# Patient Record
Sex: MALE | Race: BLACK OR AFRICAN AMERICAN
[De-identification: names, ages, dates, MRNs, and addresses within clinical notes are randomized per-mention and may not be internally consistent; named-entity substitution may affect disease eponyms.]

---

## 2017-01-07 ENCOUNTER — HOSPITAL ENCOUNTER (OUTPATIENT)
Dept: HOSPITAL 53 - M LAB | Age: 72
End: 2017-01-07
Attending: NURSE PRACTITIONER
Payer: COMMERCIAL

## 2017-01-07 DIAGNOSIS — I10: Primary | ICD-10-CM

## 2017-01-07 DIAGNOSIS — R97.20: ICD-10-CM

## 2017-01-07 LAB
ANION GAP SERPL CALC-SCNC: 7 MEQ/L (ref 8–16)
BUN SERPL-MCNC: 33 MG/DL (ref 7–18)
CALCIUM SERPL-MCNC: 8.6 MG/DL (ref 8.8–10.2)
CHLORIDE SERPL-SCNC: 105 MEQ/L (ref 98–107)
CO2 SERPL-SCNC: 30 MEQ/L (ref 21–32)
CREAT SERPL-MCNC: 1.41 MG/DL (ref 0.7–1.3)
GFR SERPL CREATININE-BSD FRML MDRD: > 60 ML/MIN/{1.73_M2} (ref 49–?)
GLUCOSE SERPL-MCNC: 229 MG/DL (ref 80–110)
POTASSIUM SERPL-SCNC: 4 MEQ/L (ref 3.5–5.1)
SODIUM SERPL-SCNC: 142 MEQ/L (ref 136–145)

## 2017-01-09 ENCOUNTER — HOSPITAL ENCOUNTER (OUTPATIENT)
Dept: HOSPITAL 53 - M RAD | Age: 72
End: 2017-01-09
Attending: UROLOGY
Payer: COMMERCIAL

## 2017-01-09 DIAGNOSIS — R97.20: Primary | ICD-10-CM

## 2017-01-09 DIAGNOSIS — M87.051: ICD-10-CM

## 2017-01-10 NOTE — REP
Multiparametric prostate MRI without and with IV gadolinium:

 

History: Elevated PSA.

 

Comparisons: September 22, 2015.

 

TECHNIQUE: Using a phased array surface coil, small field of view imaging was

acquired using T2-weighted scans in the axial, coronal, and sagittal imaging

planes. Small field of view diffusion-weighted sequences are acquired. Small

field of view axial T1-weighted scans are acquired dynamically after the

intravenous administration of  18 mL of ProHance.

 

Prostate MRI findings: There is no evidence of skeletal metastatic disease or

pelvic or inguinal lymphadenopathy.  Again noted is evidence of avascular

necrosis affecting the right femoral head.  This appears unchanged.  Urinary

bladder walls are smooth.  Seminal vesicles remain unremarkable.  There is

nodular enlargement of the central gland in the prostate.

 

Prostate glandular dimensions are 5.7 x 4.4 x 5.7 cm.  Calculated glandular

volume is 66.12 ml.  This is quite similar to the prior study. There are four

focal lesions identified on multiparametric MRI scanning of the prostate as

outlined to follow:

 

Lesion #1 is in the left apical transition zone measuring 2.0 x 1.3 x 1.3 cm,

2.37 ml.  This is characterized by a discrete homogeneous low signal intensity on

T2-weighted scans with focal areas of reduced ADC and a type 3 enhancement curve.

Clinically significant cancer is equivocal.

 

Lesion #2 is in the left base transition zone measuring 2.1 x 1.8 x 1.0 cm, 1.69

ml volume.  This is also discrete homogeneous low signal intensity focus on

T2-weighted scans.  It has intermediate diffusion weighted characteristics and a

type 3 enhancement curve.  Clinically significant cancer is equivocal.

 

Lesion #3 is in the right mid anterior peripheral zone and right mid anterior

transition zone measuring 2.0 x 1.2 x 1.0 cm, calculated volume 1.26 ml.  It is a

homogeneous low T2 signal intensity focus confined to the prostate with no

reduction in ADC and a type 2 enhancement curve.  Clinically significant cancer

is equivocal.

 

Lesion 4 is located in the left mid posterior transition zone with a calculated

volume of 1.29 ml and dimensions of 1.8 x 1.4 x 1.0 cm.  It is low signal

intensity discrete focus on T2-weighted scans.  Intermediate diffusion-weighted

characteristics and type 3 enhancement curve.

 

These targets are similar but not identical to those outlined on the September 22, 2015 study.

 

Impression:

 

1.  Avascular necrosis again seen in the right femoral head.

 

2.  Enlarged multinodular prostate gland.  There are four targets identified on

multiparametric prostate MR. These are outlined and submitted for MR ultrasound

fusion, biopsy consideration.  No extraprostatic involvement or evidence of bony

metastatic disease.

 

 

 

 

Signed by

Dwayne Giraldo MD 01/10/2017 12:01 P

## 2017-01-13 ENCOUNTER — HOSPITAL ENCOUNTER (OUTPATIENT)
Dept: HOSPITAL 53 - M SMT PRO | Age: 72
Discharge: HOME | End: 2017-01-13
Attending: UROLOGY
Payer: COMMERCIAL

## 2017-01-13 DIAGNOSIS — R97.20: Primary | ICD-10-CM

## 2017-01-13 PROCEDURE — 76942 ECHO GUIDE FOR BIOPSY: CPT

## 2017-01-13 PROCEDURE — 76872 US TRANSRECTAL: CPT

## 2017-01-13 PROCEDURE — 88344 IMHCHEM/IMCYTCHM EA MLT ANTB: CPT

## 2017-01-13 PROCEDURE — 55700: CPT

## 2017-01-13 NOTE — REP
Prostate sonography:

 

History: Elevated PSA.  MR ultrasound fusion biopsy.

 

Sonographic findings:  Trans rectal prostate sonography demonstrates unremarkable

seminal vesicles.  Prostate gland is heterogeneously enlarged with calcifications

and cystic changes noted.  Glandular dimensions are measured at 5.6 x 4.7 x 5.6

cm with a calculated glandular volume of 77.1 ml.

 

Transrectal sonographic guidance provided to Dr. Clemente who performed trans

rectal ultrasound guided needle biopsy procedure .

 

 

Signed by

Dwayne Giraldo MD 01/13/2017 11:36 A

## 2017-01-18 ENCOUNTER — HOSPITAL ENCOUNTER (INPATIENT)
Dept: HOSPITAL 53 - M ED | Age: 72
LOS: 2 days | Discharge: HOME | DRG: 113 | End: 2017-01-20
Attending: HOSPITALIST | Admitting: INTERNAL MEDICINE
Payer: COMMERCIAL

## 2017-01-18 VITALS — HEIGHT: 73 IN | BODY MASS INDEX: 26.3 KG/M2 | WEIGHT: 198.42 LBS

## 2017-01-18 VITALS — DIASTOLIC BLOOD PRESSURE: 58 MMHG | SYSTOLIC BLOOD PRESSURE: 120 MMHG

## 2017-01-18 DIAGNOSIS — Z87.891: ICD-10-CM

## 2017-01-18 DIAGNOSIS — Z79.899: ICD-10-CM

## 2017-01-18 DIAGNOSIS — D57.3: ICD-10-CM

## 2017-01-18 DIAGNOSIS — E11.9: ICD-10-CM

## 2017-01-18 DIAGNOSIS — M90.562: ICD-10-CM

## 2017-01-18 DIAGNOSIS — M90.551: ICD-10-CM

## 2017-01-18 DIAGNOSIS — I95.1: ICD-10-CM

## 2017-01-18 DIAGNOSIS — N17.9: ICD-10-CM

## 2017-01-18 DIAGNOSIS — J10.1: Primary | ICD-10-CM

## 2017-01-18 DIAGNOSIS — D61.818: ICD-10-CM

## 2017-01-18 DIAGNOSIS — R97.20: ICD-10-CM

## 2017-01-18 LAB
ALBUMIN SERPL BCG-MCNC: 3.3 GM/DL (ref 3.2–5.2)
ALBUMIN/GLOB SERPL: 1 {RATIO} (ref 1–1.93)
ALP SERPL-CCNC: 66 U/L (ref 45–117)
ALT SERPL W P-5'-P-CCNC: 38 U/L (ref 12–78)
ANION GAP SERPL CALC-SCNC: 9 MEQ/L (ref 8–16)
AST SERPL-CCNC: 47 U/L (ref 15–37)
BASOPHILS # BLD AUTO: 0 K/MM3 (ref 0–0.2)
BASOPHILS NFR BLD AUTO: 0.6 % (ref 0–1)
BILIRUB CONJ SERPL-MCNC: 0.2 MG/DL (ref 0–0.2)
BILIRUB SERPL-MCNC: 0.8 MG/DL (ref 0.2–1)
BUN SERPL-MCNC: 38 MG/DL (ref 7–18)
CALCIUM SERPL-MCNC: 7.9 MG/DL (ref 8.8–10.2)
CHLORIDE SERPL-SCNC: 104 MEQ/L (ref 98–107)
CO2 SERPL-SCNC: 29 MEQ/L (ref 21–32)
CREAT SERPL-MCNC: 1.7 MG/DL (ref 0.7–1.3)
EOSINOPHIL # BLD AUTO: 0 K/MM3 (ref 0–0.5)
EOSINOPHIL NFR BLD AUTO: 1.1 % (ref 0–3)
ERYTHROCYTE [DISTWIDTH] IN BLOOD BY AUTOMATED COUNT: 12.3 % (ref 11.5–14.5)
EST. AVERAGE GLUCOSE BLD GHB EST-MCNC: 169 MG/DL (ref 60–110)
GFR SERPL CREATININE-BSD FRML MDRD: 51.8 ML/MIN/{1.73_M2} (ref 49–?)
GLUCOSE SERPL-MCNC: 147 MG/DL (ref 80–110)
LARGE UNSTAINED CELL #: 0.1 K/MM3 (ref 0–0.4)
LARGE UNSTAINED CELL %: 3.8 % (ref 0–4)
LYMPHOCYTES # BLD AUTO: 1.1 K/MM3 (ref 1.5–4.5)
LYMPHOCYTES NFR BLD AUTO: 33 % (ref 24–44)
MAGNESIUM SERPL-MCNC: 2.4 MG/DL (ref 1.8–2.4)
MCH RBC QN AUTO: 32.1 PG (ref 27–33)
MCHC RBC AUTO-ENTMCNC: 33.5 G/DL (ref 32–36.5)
MCV RBC AUTO: 95.7 FL (ref 80–96)
MONOCYTES # BLD AUTO: 0.5 K/MM3 (ref 0–0.8)
MONOCYTES NFR BLD AUTO: 16.3 % (ref 0–5)
NEUTROPHILS # BLD AUTO: 1.4 K/MM3 (ref 1.8–7.7)
NEUTROPHILS NFR BLD AUTO: 45.1 % (ref 36–66)
PLATELET # BLD AUTO: 126 K/MM3 (ref 150–450)
POTASSIUM SERPL-SCNC: 3.6 MEQ/L (ref 3.5–5.1)
PROT SERPL-MCNC: 6.6 GM/DL (ref 6.4–8.2)
REASON FOR REVIEW: (no result)
SODIUM SERPL-SCNC: 142 MEQ/L (ref 136–145)
WBC # BLD AUTO: 3 K/MM3 (ref 4–10)

## 2017-01-18 NOTE — REPUSA
CLINICAL HISTORY: Syncope.

TECHNIQUE: Multiple axial CT images were obtained through the brain without IV contrast material.

COMMENTS: 

There is normal configuration of sella turcica. There are no intra or extra-axial collections. There 
is no mass effect or midline shift. There is no evidence of hematoma formation. No hydrocephalus is p
resent. The ventricles are symmetrical.  Bilateral basal ganglia calcifications are present. 

There is diffuse age-appropriate cerebellar and cerebral atrophy with proportionally dilated ventricl
es and cortical sulci. 

There are bilateral periventricular and subcortical white matter hypolucencies compatible with mild c
hronic microvascular disease.

Otherwise, no significant focal abnormalities are seen either in the posterior fossa or supratentoria
l compartment.

IMPRESSION:

1. Age-appropriate cerebellar and cerebral atrophy.

2. Mild chronic microvascular disease.

3. No evidence of acute intracranial pathology. 

Thank you for your kind referral of this patient.

 

     Electronically signed by GORGE DURAN MD on 01/18/2017 07:55:20 PM ET

## 2017-01-18 NOTE — HPE
DATE OF ADMISSION:  01/18/2017

 

PRIMARY CARE PROVIDER: Marry Steele MD

 

UROLOGIST: Hermilo Clemente MD

 

CHIEF COMPLAINT: "I fell"

 

SUMMARY OF HIS PRESENTATION: Mr. Sam had a cold on Sunday, went to work, was

unable to go to work on Monday and Tuesday because he felt weak. He had

subjective fever, cough, which he treated with over-the-counter remedies. Awoke

on Wednesday morning which is this morning feeling quite well. His car had been

in the repair shop and he felt well enough to go get it. He has been eating and

drinking apparently normally. He tends to urinate more frequently than he thinks

he should. He decided to go to work today. Usually he goes to bed around 4:00 pm

and gets up at 8:30 pm for his 10:00 pm shift. He went to the refrigerator to get

a bottle of water. He felt a little light headed and then the next thing he knew

he awoke on the floor with an open bottle of water. He does not know how long he

was down. He did not remember falling. Afterwards his memory seems a little

sketchy but he got onto the couch. His wife returned around 6:15 and he was on

the couch which was unusual. Usually he would be in the bedroom at that point. He

did not know what day it was and seemed a little slower than normal. He has never

had an event like this happen before. He did not suffer incontinence. He did have

left leg pain after the event, which hurts around his knee when he bends the

knee. Around the time of the event he noted no unusual smells, sights or sounds.

He has no history of seizure. He described no chest pain, no palpitation. This

has never happened before.

 

PAST MEDICAL HISTORY: Notable for:

Anemia.

Diabetes.

Hypertension.

History of pyelonephritis.

History of elevated prostate level.

History of kidney stones.

 

PAST SURGICAL HISTORY: Notable for:

A pair of stomach ulcers.

Various urologic procedures.

 

SOCIAL HISTORY: He quit smoking in 1992. Does not drink any alcohol. He lives in

[place]. He is originally from [place]. He has been in [place] for 20

years.

 

FAMILY HISTORY: Mother is 89, alive and well. Father who passed away in his 60s

of unknown causes.

 

REVIEW OF SYSTEMS: No headache, no visual changes. He has had runny nose and a

sore throat. He has had some cough. No chest pain. No orthopnea. No paroxysmal

nocturnal dyspnea. No lower extremity edema. He has diabetes which is treated

without insulin. He has had some medication changes recently. He has not filled

those prescriptions yet though. He does not regularly see a podiatrist. No

abdominal pain. No changes in his bowel or bladder habits, otherwise

unremarkable.

 

PHYSICAL EXAMINATION: Blood pressure is notable for him having orthostasis based

on his heart rate. Blood pressure on arrival was 95/59 with a pulse of 87.

Respiratory rate 18, temperature 98.6, 95% on room air. He weighs 92.99 kg. Body

mass index is 27. He is awake, appropriately interactive and pleasantly

conversant although he appears somewhat tired. Head is normocephalic. Sclera are

injected. Pupils are equal, round and reactive. Mucous membranes moist. He has a

dental plate. Neck is supple. No cervical or supraclavicular adenopathy.

Breathing is symmetrical and rested. I to E ratio is 1 to 3. There are no

wheezes, rales or rhonchi. Heart is in regular rate and rhythm. Distant sounding.

Normal S1, S2. Is not tachycardic. Distal pulses radius are 2+. Capillary refill

is less than 2 seconds. Abdomen is soft, doughy, nontender. There is a midline

incision superior to the umbilicus running to approximately the xyphoid. There is

no lower extremity edema. There are no foot lesions. Sensation is grossly intact.

He is moving all four extremities. Cranial nerves II through XII are grossly

intact. He has normal mood and affect.

 

White cell count is 3. Hemoglobin is 12 and platelets of 126. Sodium 142. BUN 38,

creatinine 1.7, which is higher than his baseline, which is probably around 1.4.

Glucose is 147. Hemoglobin A1c is 7.5. Calcium 7.9. . Troponin I less than

0.02. TSH 0.77.

 

UA is notable for 3+ glucose, 1+ blood.

 

Urine culture is pending.

 

Tibia/fibula x-ray shows unusual lesions around the knee which may require

further workup.

 

Head CT was age appropriate with some mild chronic microvascular disease.

 

Chest x-ray showed no infiltrate, mass or effusion. Lungs were clear and

unchanged.

 

EKG shows the patient to be in a sinus rhythm with QTc of 399.

 

ASSESSMENT: This is a 71-year-old with syncopal event. The patient required two

midnight hospital stay for further workup and treatment.

 

PLAN:

1. Syncope. This has elements of both a cardiac and neurologic event. The patient

took awhile to awake. At this point, he is back to his normal mentation according

to his wife, but approximately two hours after the event he did not know the day,

which would be less consistent with a cardiac event. As such, we will pursue and

MRI as well as an EEG. Given his age and history, will also obtain an

echocardiogram and monitor him on telemetry. Repeat a CK and troponin with the

next blood draw in the morning. We will consult as necessary.

 

2. The patient is orthostatic, which could be the cause of his presentation as

well, although once again he had a prolonged recovery time. Will hold his

antihypertensive medications, give IV fluid and continue to monitor him

clinically.

 

3. The patient had an upper respiratory tract infection. Will swab for influenza

and again monitor him clinically.

 

4. The patient is noted to be pancytopenic. This could be the result of the a

viral illness. Will get a peripheral smear and pursue other workup as viral

suppression is most likely cause.

 

5. The patient has diabetes. Will be placed on insulin during his stay.

Hemoglobin A1c is 7.5.

 

6. The patient has elevated PSA.

 

7. The patient has unusual findings on his x-ray of leg. May require followup

based on the official read.

 

8. Deep venous thrombosis (DVT) prophylaxis will be mechanical as his platelets

are lower than normal.

## 2017-01-18 NOTE — EDDOCDS
Physician Documentation                                                                           

[facility]                                                                         

Name: Jose Antonio Sam                                                                               

Age: 71 yrs                                                                                       

Sex: Male                                                                                         

: 1945                                                                                   

MRN: [id number]                                                                                     

Arrival Date: 2017                                                                          

Time: 18:30                                                                                       

Account#: [id number]                                                                              

Bed 11                                                                                            

Private MD: Marry Steele A                                                                     

Disposition:                                                                                      

17 21:18 Hospitalization ordered by Mayur Mckeon for Inpatient Admission. Preliminary       

diagnosis are Syncope and collapse, Hypotension, Pancytopenia.                                  

- Bed requested for  PCU.                                                                        

- Status is Inpatient Admission.                                                              sls1

- Condition is Stable.                                                                            

- Problem is an acute exacerbation.                                                               

- Symptoms have improved.                                                                         

                                                                                                  

                                                                                                  

                                                                                                  

Historical:                                                                                       

- Allergies: no known allergies;                                                                  

- Home Meds:                                                                                      

1. aspirin 81 mg Oral TbEC once daily on hold                                                   

2. Drisdol 50,000 unit Oral cap every 2 weeks                                                   

3. Hyzaar 100-12.5 mg Oral tab 1 tab once daily                                                 

4. Tiazac 120 mg Oral cpER 1 cap once daily                                                     

5. vitamin b12 inj monthly                                                                      

6. Vitamin B-6 100 mg Oral tab                                                                  

- PMHx: Anemia; Diabetes - NIDDM: controlled; Hypertension; Pyelonephritis;                       

- PSHx: repair of stomach ulcers;                                                                 

- Social history: Smoking status: Patient states former smoker of tobacco. No barriers            

to communication noted, The patient speaks fluent English, Speaks appropriately for             

age.                                                                                            

- Family history: Not pertinent.                                                                  

- : The pt / caregiver states he / she is not on anticoagulants. Home medication list             

is obtained from the patient.                                                                   

- Exposure Risk Screening:: None identified.                                                      

                                                                                                  

                                                                                                  

Vital Signs:                                                                                      

                                                                                             

18:32 BP 95 / 59; Pulse 87; Resp 18 S; Temp 98.6(O); Pulse Ox 95% on R/A; Weight 92.99 kg /   gr2 

      205.01 lbs (R); Height 6 ft. 1 in. (185.42 cm) (R); Pain 7/10;                              

18:42  / 56 (auto/);                                                                    kas2

18:45 Pulse 80 MON; Pulse Ox 95% ;                                                            kas2

18:45 Resp 18; Temp 98.2(O); Pain 0/10;                                                       kas2

18:57  / 58 (auto/);                                                                    kas2

18:57 Pulse 84 MON; Pulse Ox 93% ;                                                            kas2

19:12  / 60 (auto/);                                                                    kas2

19:12 Pulse 88 MON; Pulse Ox 96% ;                                                            kas2

19:27  / 61 (auto/);                                                                    kas2

19:27 Pulse 82 MON; Pulse Ox 96% ;                                                            kas2

19:42 BP 99 / 50 (auto/);                                                                     kas2

19:42 Pulse 84 MON; Pulse Ox 94% ;                                                            kas2

19:57  / 59 (auto/);                                                                    kas2

19:57 Pulse 80 MON; Pulse Ox 93% ;                                                            kas2

20:12 BP 98 / 54 RA Supine; Pulse 77; Resp 18; Temp 96.2; Pulse Ox 92% on R/A; Pain 0/10;     kas2

20:12 BP 83 / 53 RA Sitting; Pulse 86; Resp 18; Pulse Ox 94% on R/A;                          kas2

20:12 BP 87 / 54 RA Standing; Pulse 95; Resp 18; Pulse Ox 97% on R/A;                         kas2

20:12  / 60 (auto/);                                                                    kas2

20:12 Pulse 76 MON; Pulse Ox 93% ;                                                            kas2

20:27  / 59 (auto/);                                                                    kas2

20:27 Pulse 74 MON; Pulse Ox 99% ;                                                            kas2

20:42  / 59 (auto/);                                                                    kas2

20:42 Pulse 74 MON; Pulse Ox 92% ;                                                            kas2

20:57  / 58 (auto/);                                                                    kas2

20:57 Pulse 74 MON; Pulse Ox 96% ;                                                            kas2

21:12  / 61 (auto/);                                                                    kas2

21:12 Pulse 74 MON; Pulse Ox 95% ;                                                            kas2

21:27  / 62 (auto/);                                                                    kas2

21:27 Pulse 76 MON; Pulse Ox 96% ;                                                            kas2

21:42  / 59 (auto/);                                                                    kas2

21:42 Pulse 78 MON; Pulse Ox 98% ;                                                            kas2

21:57  / 66 (auto/);                                                                    kas2

21:57 Pulse 78 MON; Pulse Ox 98% ;                                                            kas2

22:12  / 59 (auto/);                                                                    kas2

22:12 Pulse 78 MON; Pulse Ox 96% ;                                                            kas2

22:15  / 59; Pulse 78; Resp 18; Temp 97.9; Pulse Ox 98% on R/A; Pain 0/10;              kas2

18:32 Body Mass Index 27.05 (92.99 kg, 185.42 cm)                                             gr2 

                                                                                                  

MDM:                                                                                              

18:55 ECG WITH READING ER PHYS+CARDIAG ordered.                                               EDMS

19:06 Fingerstick Blood Sugar Ordered.                                                        EDMS

19:35 Cardiac Monitor/Pulse Ox/q 15 min VS ordered.                                           mm11

19:35 IV Saline Lock ordered.                                                                 mm11

19:35 Orthostatic VS ordered.                                                                 mm11

19:35 Rhythm Strip to chart ordered.                                                          mm11

19:35 Basic Metabolic Profile Ordered.                                                        EDMS

19:35 CBC with Diff Ordered.                                                                  EDMS

19:35 Cardiac Injury Profile Ordered.                                                         EDMS

19:36 Thyroid Stimulating Hormone Ordered.                                                    EDMS

19:36 Troponin Ordered.                                                                       EDMS

19:36 Urinalysis Ordered.                                                                     EDMS

19:36 Urine Culture Ordered.                                                                  EDMS

19:36 Chest, 1 View Ordered.                                                                  EDMS

19:36 Tibia/Fibula Ordered.                                                                   EDMS

19:36 CT Head Without Contrast Ordered.                                                       EDMS

19:39 Financial registration complete.                                                        zo  

19:40 [facility] Payment Agreement was scanned into [employer] and attached to record.               zo  

20:31 Fingerstick Blood Sugar Reviewed.                                                       mm11

20:31 Basic Metabolic Profile Reviewed.                                                       mm11

20:31 CBC with Diff Reviewed.                                                                 mm11

20:31 Cardiac Injury Profile Reviewed.                                                        mm11

20:31 Thyroid Stimulating Hormone Reviewed.                                                   mm11

20:31 Troponin Reviewed.                                                                      mm11

20:35 NS 0.9% 1000 ml IV at bolus once ordered.                                               mm11

21:05 Urinalysis Reviewed.                                                                    mm11

21:05 CT Head Without Contrast Reviewed.                                                      mm11

21:07 BED REQUEST+ADM ordered.                                                                EDMS

21:14 Hemoglobin A1c Ordered.                                                                 EDMS

21:51 Hemoglobin A1c Reviewed.                                                                mm11

21:57 Admission / Observation Status ordered.                                                 EDMS

21:57 ECHOCARD,DOPPLER/COLOR FLOW ordered.                                                    EDMS

21:57 ELECTROCARDIOGRAM ADULT ordered.                                                        EDMS

21:57 CONSISTENT CARBOHYDRATES ordered.                                                       EDMS

21:57 CBC WITH DIFFERENTIAL Ordered.                                                          EDMS

21:58 COMPLETE COMPHRENSIVE METABOLI Ordered.                                                 EDMS

21:58 MAGNESIUM LEVEL Ordered.                                                                EDMS

22:01 INFLUENZA A&B RAPID ANTIGEN Ordered.                                                    EDMS

22:03 CARDIAC MARKER PANEL Ordered.                                                           EDMS

22:03 IONIZED CALCIUM Ordered.                                                                EDMS

22:04 LIVER PROFILE Ordered.                                                                  EDMS

22:05 MAGNESIUM LEVEL Ordered.                                                                EDMS

22:05 PATHOLOGIST REVIEW COMPREHENSI Ordered.                                                 EDMS

22:19 MRA BRAIN W/O CONTRAST Ordered.                                                         EDMS

22:19 MRI Brain without Contrast Ordered.                                                     EDMS

                                                                                                  

Administered Medications:                                                                         

20:45 Drug: NS 0.9% 1000 ml [sodium chloride 0.9 % intravenous solution] Route: IV; Rate:     kas2

      bolus; Site: right forearm;                                                                 

                                                                                                  

                                                                                                  

Signatures:                                                                                       

Dispatcher [employer]                           EDRosi Cheung RN            RN   Oli Prather RN                   RN   mlb1                                                 

Shailesh Ramirez Matthew,                     DO   mm11                                                 

Mariella Kumar RN                     RN   sls1                                                 

Nasreen Crandall RN                            RN   kas2                                                 

                                                                                                  

The chart was reviewed and I authenticate all verbal orders and agree with the evaluation and 
treatment provided.Corrections: (The following items were deleted from the chart)

22:02 22:01 LIVER PROFILE ordered. EDMS                                                       EDMS

22:04 22:02 MAGNESIUM LEVEL ordered. EDMS                                                     EDMS

                                                                                                  

Attachments:                                                                                      

19:40 [facility] Payment Agreement                                                                zo  

                                                                                                  

**************************************************************************************************

MTDD

## 2017-01-18 NOTE — EDDOCDS
Nurse's Notes                                                                                     

[facility]                                                                         

Name: Jose Antonio Sam                                                                               

Age: 71 yrs                                                                                       

Sex: Male                                                                                         

: 1945                                                                                   

MRN: [id number]                                                                                     

Arrival Date: 2017                                                                          

Time: 18:30                                                                                       

Account#: [id number]                                                                              

Bed 11                                                                                            

Private MD: Marry Steele A                                                                     

Diagnosis: Syncope and collapse;Hypotension;Pancytopenia                                          

                                                                                                  

Presentation:                                                                                     

                                                                                             

18:36 Presenting complaint: Patient states: Syncopal episode at home at 1430 today c/o pain   mlb1

      left knee. Adult Sepsis Screening: The patient does not have new or worsening altered       

      mentation. Patient's respiratory rate is less than 22. Systolic blood pressure is           

      greater than 100. Patient has a qSOFA score of 0- Negative Sepsis Screen.                   

      Suicide/Homicide risk assessment- the patient denies having any suicidal and/or             

      homicidal ideations and does not present with any other emotional, behavioral or mental     

      health complaints.  Status: Patient is not a  or              

      dependent. Transition of care: patient was not received from another setting of care.       

18:36 Acuity: MIRIAN Level 3                                                                     mlb1

18:36 Method Of Arrival: Walkin/Carried/Asstd                                                 mlb1

                                                                                                  

Triage Assessment:                                                                                

18:39 General: Appears in no apparent distress, Behavior is appropriate for age, cooperative. mlb1

      Pain: Location: lateral aspect of left knee Pain currently is 8 out of 10 on a pain         

      scale. Neurological: Level of Consciousness is awake, alert, Oriented to person, place,     

      time.                                                                                       

                                                                                                  

Historical:                                                                                       

- Allergies: no known allergies;                                                                  

- Home Meds:                                                                                      

1. aspirin 81 mg Oral TbEC once daily on hold                                                   

2. Drisdol 50,000 unit Oral cap every 2 weeks                                                   

3. Hyzaar 100-12.5 mg Oral tab 1 tab once daily                                                 

4. Tiazac 120 mg Oral cpER 1 cap once daily                                                     

5. vitamin b12 inj monthly                                                                      

6. Vitamin B-6 100 mg Oral tab                                                                  

- PMHx: Anemia; Diabetes - NIDDM: controlled; Hypertension; Pyelonephritis;                       

- PSHx: repair of stomach ulcers;                                                                 

- Social history: Smoking status: Patient states former smoker of tobacco. No barriers            

to communication noted, The patient speaks fluent English, Speaks appropriately for             

age.                                                                                            

- Family history: Not pertinent.                                                                  

- : The pt / caregiver states he / she is not on anticoagulants. Home medication list             

is obtained from the patient.                                                                   

- Exposure Risk Screening:: None identified.                                                      

                                                                                                  

                                                                                                  

Screenin:41 Screening information is obtained from the patient. Fall risk: No risks identified.     kas2

      Assistance ADL's: requires no assistance with activities of daily living. Abuse/DV          

      Screen: The patient / caregiver reports he/she is: not in a situation that causes fear,     

      pain or injury. Nutritional screening: No deficits noted. Advance Directives:               

      Currently, there is no health care proxy. There is no active DNR order. There is no         

      living will. There is no Power of . home support is adequate.                       

                                                                                                  

Assessment:                                                                                       

19:35 General: Patient gone to CT scan via stretcher with tech..                              kas2

19:40 General: Appears in no apparent distress, comfortable, well nourished, well groomed,    kas2

      Behavior is appropriate for age, cooperative. Pain: Denies pain. Neurological: Level of     

      Consciousness is awake, alert, Oriented to person, place, time,  are equal             

      bilaterally Moves all extremities. Speech is normal, Facial symmetry appears normal,        

      Pupils are PERRLA. Cardiovascular: Capillary refill < 3 seconds Heart tones S1 S2           

      present Rhythm is sinus rhythm No ectopy. Respiratory: Airway is patent Respiratory         

      effort is even, unlabored, Respiratory pattern is regular, symmetrical, Breath sounds       

      are clear bilaterally. Derm: Skin is intact, is healthy with good turgor, Skin is dry,      

      Skin temperature is warm.                                                                   

20:14 General:.                                                                               kas2

20:15 General: Patient back from CT scan via stretcher with tech. Resettled in bed. Patient   kas2

      denies pain or discomfort at this time. No apparent distress. Family at bedside. Call       

      bell within reach. Will continue to monitor..                                               

21:25 General: Patient laying in bed with family at bedside. No apparent distress noted.      kas2

      Patient denies pain or discomfort at this time. Airway patent and respiratory pattern       

      even and unlabored. Call bell within reach. Will continue to monitor..                      

22:13 General: Appears in no apparent distress, comfortable, Behavior is appropriate for age, kas2

      cooperative. Pain: Denies pain. Neurological: Level of Consciousness is awake, alert,       

      Oriented to person, place, time. Cardiovascular: Capillary refill < 3 seconds Heart         

      tones S1 S2 present Rhythm is sinus rhythm No ectopy. Respiratory: Airway is patent         

      Respiratory effort is even, unlabored, Respiratory pattern is regular, symmetrical,         

      Breath sounds are clear bilaterally. Derm: Skin is intact, is healthy with good turgor,     

      Skin is dry, Skin temperature is warm.                                                      

                                                                                                  

Vital Signs:                                                                                      

18:32 BP 95 / 59; Pulse 87; Resp 18 S; Temp 98.6(O); Pulse Ox 95% on R/A; Weight 92.99 kg     gr2 

      (R); Height 6 ft. 1 in. (185.42 cm) (R); Pain 7/10;                                         

18:42  / 56 (auto/);                                                                    kas2

18:45 Pulse 80 MON; Pulse Ox 95% ;                                                            kas2

18:45 Resp 18; Temp 98.2(O); Pain 0/10;                                                       kas2

18:57  / 58 (auto/);                                                                    kas2

18:57 Pulse 84 MON; Pulse Ox 93% ;                                                            kas2

19:12  / 60 (auto/);                                                                    kas2

19:12 Pulse 88 MON; Pulse Ox 96% ;                                                            kas2

19:27  / 61 (auto/);                                                                    kas2

19:27 Pulse 82 MON; Pulse Ox 96% ;                                                            kas2

19:42 BP 99 / 50 (auto/);                                                                     kas2

19:42 Pulse 84 MON; Pulse Ox 94% ;                                                            kas2

19:57  / 59 (auto/);                                                                    kas2

19:57 Pulse 80 MON; Pulse Ox 93% ;                                                            kas2

20:12 BP 98 / 54 RA Supine; Pulse 77; Resp 18; Temp 96.2; Pulse Ox 92% on R/A; Pain 0/10;     kas2

20:12 BP 83 / 53 RA Sitting; Pulse 86; Resp 18; Pulse Ox 94% on R/A;                          kas2

20:12 BP 87 / 54 RA Standing; Pulse 95; Resp 18; Pulse Ox 97% on R/A;                         kas2

20:12  / 60 (auto/);                                                                    kas2

20:12 Pulse 76 MON; Pulse Ox 93% ;                                                            kas2

20:27  / 59 (auto/);                                                                    kas2

20:27 Pulse 74 MON; Pulse Ox 99% ;                                                            kas2

20:42  / 59 (auto/);                                                                    kas2

20:42 Pulse 74 MON; Pulse Ox 92% ;                                                            kas2

20:57  / 58 (auto/);                                                                    kas2

20:57 Pulse 74 MON; Pulse Ox 96% ;                                                            kas2

21:12  / 61 (auto/);                                                                    kas2

21:12 Pulse 74 MON; Pulse Ox 95% ;                                                            kas2

21:27  / 62 (auto/);                                                                    kas2

21:27 Pulse 76 MON; Pulse Ox 96% ;                                                            kas2

21:42  / 59 (auto/);                                                                    kas2

21:42 Pulse 78 MON; Pulse Ox 98% ;                                                            kas2

21:57  / 66 (auto/);                                                                    kas2

21:57 Pulse 78 MON; Pulse Ox 98% ;                                                            kas2

22:12  / 59 (auto/);                                                                    kas2

22:12 Pulse 78 MON; Pulse Ox 96% ;                                                            kas2

22:15  / 59; Pulse 78; Resp 18; Temp 97.9; Pulse Ox 98% on R/A; Pain 0/10;              kas2

18:32 Body Mass Index 27.05 (92.99 kg, 185.42 cm)                                             gr2 

                                                                                                  

Vitals:                                                                                           

18:32 Log In Time: 2017 at 18:32. RN notified that patient meets Red Flag         gr2 

      criteria.                                                                                   

                                                                                                  

ED Course:                                                                                        

18:32 Patient visited by [address]                                                   gr2 

18:32 Marry Steele is Private Physician.                                                    gr2 

18:32 Patient moved to Waiting                                                                gr2 

18:34 Patient visited by [address]                                                   gr2 

18:34 Patient moved to Pre RCE                                                                gr2 

18:35 Patient visited by Oli Kelley, SHERI.                                               mlb1

18:37 Triage Initiated                                                                        mlb1

18:39 Patient visited by Oli Kelley, RN.                                               mlb1

18:40 Elena Ortega,RN is Primary Nurse.                                                        mlb1

18:40 Patient moved to 11                                                                     mlb1

18:59 Nasreen Crandall,SHERI is Primary Nurse.                                                          kas2

19:05 Patient visited by Boubacar Chavira PCA.                                                     jmv 

19:05 EKG done. (by ED staff). Reviewed by Bret METCALF.                                   jmv 

19:06 Patient visited by Boubacar Chavira PCA.                                                     jmv 

19:18 Guy Watson DO is Attending Physician.                                            mm11

19:18 Patient visited by Guy Watson DO.                                                mm11

19:31 Patient visited by Guy Watson DO.                                                mm11

19:38 Basic Metabolic Profile Sent.                                                           kas2

19:38 CBC with Diff Sent.                                                                     kas2

19:38 Cardiac Injury Profile Sent.                                                            kas2

19:38 Thyroid Stimulating Hormone Sent.                                                       kas2

19:39 Troponin Sent.                                                                          kas2

19:40 [facility] Payment Agreement was scanned into [employer] and attached to record.               zo  

19:41 Patient visited by Nasreen Crandall RN.                                                        kas2

19:41 Inserted saline lock: 20 gauge in right forearm and blood collected. The patient        kas2

      tolerated the procedure well. No procedures done that require assistance.                   

19:44 Patient name changed from Jose Antonio\S\\S\Chaple\S\ to Jose Antonio\S\ \S\Chaple.                        
   EDMS

20:16 Patient visited by Nasreen Crandall RN.                                                        kas2

20:37 Urinalysis Sent.                                                                        kas2

20:37 Urine Culture Sent.                                                                     kas2

20:42 CT Head Without Contrast Returned.                                                      EDMS

21:12 Patient visited by Nasreen Crandall RN.                                                        kas2

21:15 Patient visited by Nasreen Crandall RN.                                                        kas2

21:18 Mayur Mckeon MD is Hospitalizing Provider.                                             mm11

21:44 Patient visited by Nasreen Crandall RN.                                                        kas2

22:19 Patient visited by Nasreen Crandall RN.                                                        kas2

22:29 The patient / caregiver is instructed regarding the plan of care and ED course.         kas2

22:31 Patient visited by Nasreen Crandall RN.                                                        kas2

                                                                                                  

Administered Medications:                                                                         

20:45 Drug: NS 0.9% 1000 ml [sodium chloride 0.9 % intravenous solution] Route: IV; Rate:     kas2

      bolus; Site: right forearm;                                                                 

                                                                                                  

                                                                                                  

Order Results:                                                                                    

Lab Order: Fingerstick Blood Sugar; SPEC'M 17 18:52                                         

      Test: BEDSIDE GLUCOSE; Value: 145; Range: ; Abnormal: Above high normal; Units:       

      MG/DL; Status: F                                                                            

Lab Order: Basic Metabolic Profile; SPEC'M 17 19:20                                         

      Test: GLUCOSE, FASTING; Value: 147; Range: ; Abnormal: Above high normal; Units:      

      MG/DL; Status: F                                                                            

      Test: BLOOD UREA NITROGEN; Value: 38; Range: 7-18; Abnormal: Above high normal; Units:      

      MG/DL; Status: F                                                                            

      Test: CREATININE FOR GFR; Value: 1.70; Range: 0.70-1.30; Abnormal: Above high normal;       

      Units: MG/DL; Status: F                                                                     

      Test: GLOMERULAR FILTRATION RATE; Value: 51.8; Range: >49; Status: F                        

      Test: SODIUM LEVEL; Value: 142; Range: 136-145; Units: MEQ/L; Status: F                     

      Test: POTASSIUM SERUM; Value: 3.6; Range: 3.5-5.1; Units: MEQ/L; Status: F                  

      Test: CHLORIDE LEVEL; Value: 104; Range: ; Units: MEQ/L; Status: F                    

      Test: CARBON DIOXIDE LEVEL; Value: 29; Range: 21-32; Units: MEQ/L; Status: F                

      Test: ANION GAP; Value: 9; Range: 8-16; Units: MEQ/L; Status: F                             

      Test: CALCIUM LEVEL; Value: 7.9; Range: 8.8-10.2; Abnormal: Below low normal; Units:        

      MG/DL; Status: F                                                                            

      Test Note: &nbsp;; Units are mL/min/1.73 m2 Chronic Kidney Disease Staging per NKF:       

      Stage I & II GFR >=60 Normal to Mildly Decreased Stage III GFR 30-59 Moderately           

      Decreased Stage IV GFR 15-29 Severely Decreased Stage V GFR <15 Very Little GFR Left        

      ESRD GFR <15 on RRT                                                                         

      Test: AST/SGOT; Range: 15-37; Units: U/L; Status: I                                         

      Test: ALT/SGPT; Range: 12-78; Units: U/L; Status: I                                         

      Test: ALKALINE PHOSPHATASE; Range: ; Units: U/L; Status: I                            

      Test: BILIRUBIN,TOTAL; Range: 0.2-1.0; Units: MG/DL; Status: I                              

      Test: BILIRUBIN,DIRECT; Range: 0.0-0.2; Units: MG/DL; Status: I                             

      Test: TOTAL PROTEIN; Range: 6.4-8.2; Units: GM/DL; Status: I                                

      Test: ALBUMIN; Range: 3.2-5.2; Units: GM/DL; Status: I                                      

      Test: ALBUMIN/GLOBULIN RATIO; Range: 1.00-1.93; Status: I                                   

Lab Order: CBC with Diff; SPEC'M 17 19:20                                                   

      Test: WHITE BLOOD COUNT; Value: 3.0; Range: 4.0-10.0; Abnormal: Below low normal;           

      Units: K/mm3; Status: F                                                                     

      Test: RED BLOOD COUNT; Value: 3.74; Range: 4.30-6.10; Abnormal: Below low normal;           

      Units: M/mm3; Status: F                                                                     

      Test: HEMOGLOBIN; Value: 12.0; Range: 14.0-18.0; Abnormal: Below low normal; Units:         

      g/dl; Status: F                                                                             

      Test: HEMATOCRIT; Value: 35.8; Range: 42.0-52.0; Abnormal: Below low normal; Units: %;      

      Status: F                                                                                   

      Test: MEAN CORPUSCULAR VOLUME; Value: 95.7; Range: 80.0-96.0; Units: fl; Status: F          

      Test: MEAN CORPUSCULAR HEMOGLOBIN; Value: 32.1; Range: 27.0-33.0; Units: pg; Status: F      

      Test: MEAN CORPUSCULAR HGB CONC; Value: 33.5; Range: 32.0-36.5; Units: g/dl; Status: F      

      Test: RED CELL DISTRIBUTION WIDTH; Value: 12.3; Range: 11.5-14.5; Units: %; Status: F       

      Test: PLATELET COUNT, AUTOMATED; Value: 126; Range: 150-450; Abnormal: Below low            

      normal; Units: k/mm3; Status: F                                                             

      Test: NEUTROPHILS %; Value: 45.1; Range: 36.0-66.0; Units: %; Status: F                     

      Test: LYMPH %; Value: 33.0; Range: 24.0-44.0; Units: %; Status: F                           

      Test: MONO %; Value: 16.3; Range: 0.0-5.0; Abnormal: Above high normal; Units: %;           

      Status: F                                                                                   

      Test: EOS %; Value: 1.1; Range: 0.0-3.0; Units: %; Status: F                                

      Test: BASO %; Value: 0.6; Range: 0.0-1.0; Units: %; Status: F                               

      Test: LARGE UNSTAINED CELL %; Value: 3.8; Range: 0.0-4.0; Units: %; Status: F               

      Test: NEUTROPHILS #; Value: 1.4; Range: 1.8-7.7; Abnormal: Below low normal; Units:         

      K/mm3; Status: F                                                                            

      Test: LYMPH #; Value: 1.1; Range: 1.5-4.5; Abnormal: Below low normal; Units: K/mm3;        

      Status: F                                                                                   

      Test: MONO #; Value: 0.5; Range: 0.0-0.8; Units: K/mm3; Status: F                           

      Test: EOS #; Value: 0.0; Range: 0.0-0.50; Units: K/mm3; Status: F                           

      Test: BASO #; Value: 0.0; Range: 0.0-0.2; Units: K/mm3; Status: F                           

      Test: LARGE UNSTAINED CELL #; Value: 0.1; Range: 0.0-0.4; Units: K/mm3; Status: F           

Lab Order: Cardiac Injury Profile; SPEC'M 17 19:20                                          

      Test: CPK CREATINE PHOSPHOKINASE; Value: 462; Range: ; Abnormal: Above high           

      normal; Units: U/L; Status: F                                                               

      Test: CK-MB VALUE MASS; Value: 2.5; Range: 0.0-3.6; Units: NG/ML; Status: F                 

      Test: MB/CK RELATIVE INDEX; Value: 0.54; Range: < OR =4; Status: F                          

      Test Note: &nbsp;; DIAGNOSIS CRITERIA MMB ng/ml Relative Index (RI) NON-AMI < or = 5      

      N/A GRAY ZONE > 5 < or = 4 AMI > 5 > 4                                                      

Lab Order: Thyroid Stimulating Hormone; SPEC'M 17 19:20                                     

      Test: THYROID STIMULATING HORMONE; Value: 0.770; Range: 0.358-3.740; Units: uIU/ML;         

      Status: F                                                                                   

Lab Order: Troponin; SPEC'M 17 19:20                                                        

      Test: TROPONIN I; Value: < 0.02; Range: < 0.10; Units: NG/ML; Status: F                     

      Test Note: &nbsp;; Troponin I Reference Interval for [employer] [place] LOCI: 99th             

      Percentile= 0.00-0.045 ng/ml Risk Stratification: <= 0.10 ng/ml Decreased Risk for          

      Adverse Clinical Events. 0.10-1.50 ng/ml Increased Risk for Adverse Clinical Events.        

      Evaluation of additional criterion and/or repeat testing in 2-6 hours is suggested to       

      rule out myocardial damage. >= 1.50 ng/ml Indicative of Myocardial Injury.                  

Lab Order: Urinalysis; SPEC'M 17 20:36                                                      

      Test: APPEARANCE, URINE; Value: CLEAR; Range: CLEAR; Status: F                              

      Test: COLOR, URINE; Value: YELLOW; Range: YELLOW; Status: F                                 

      Test: PH,URINE; Value: 5.0; Range: 5.0-9.0; Units: UNITS; Status: F                         

      Test: SPECIFIC GRAVITY URINE AUTO; Value: 1.012; Range: 1.002-1.035; Status: F              

      Test: PROTEIN, URINE AUTO; Value: NEGATIVE; Range: NEGATIVE; Units: mg/dL; Status: F        

      Test: GLUCOSE, URINE (UA) AUTO; Value: 3+; Range: NEGATIVE; Abnormal: Above high            

      normal; Units: mg/dL; Status: F                                                             

      Test: KETONE, URINE AUTO; Value: NEGATIVE; Range: NEGATIVE; Units: mg/dL; Status: F         

      Test: UROBILINOGEN, URINE AUTO; Value: 0.2; Range: 0.0-2.0; Units: mg/dL; Status: F         

      Test: BILIRUBIN, URINE AUTO; Value: NEGATIVE; Range: NEGATIVE; Status: F                    

      Test: NITRITE, URINE AUTO; Value: NEGATIVE; Range: NEGATIVE; Status: F                      

      Test: LEUKOCYTE ESTERASE, URINE AUTO; Value: NEGATIVE; Range: NEGATIVE; Status: F           

      Test: BLOOD, URINE BLOOD; Value: 1+; Range: NEGATIVE; Abnormal: Above high normal;          

      Status: F                                                                                   

      Test: WBC, URINE AUTO; Value: 1; Range: 0-3; Units: /HPF; Status: F                         

      Test: RBC, URINE AUTO; Value: 3; Range: 0-3; Units: /HPF; Status: F                         

      Test: BACTERIA, URINE AUTO; Value: NEGATIVE; Range: NEGATIVE; Status: F                     

      Test: SQUAMOUS EPITHELIAL CELL UR AU; Value: 0; Range: 0-6; Units: /HPF; Status: F          

      Test: MUCUS, URINE; Value: SMALL; Range: NEGATIVE; Status: F                                

      Test: HYALINE CAST, URINE AUTO; Value: 0; Range: 0-1; Units: /LPF; Status: F                

Lab Order: Hemoglobin A1c; SPEC'M 17 19:20                                                  

      Test: HEMOGLOBIN A1c; Value: 7.5; Range: 4.5-6.2; Abnormal: Above high normal; Units:       

      %; Status: F                                                                                

      Test: ESTIMATED AVERAGE GLUCOSE; Value: 169; Range: ; Abnormal: Above high            

      normal; Units: MG/DL; Status: F                                                             

Lab Order: LIVER PROFILE; SPEC'M 17 19:20                                                   

      Test: AST/SGOT; Value: 47; Range: 15-37; Abnormal: Above high normal; Units: U/L;           

      Status: F                                                                                   

      Test: ALT/SGPT; Value: 38; Range: 12-78; Units: U/L; Status: F                              

      Test: ALKALINE PHOSPHATASE; Value: 66; Range: ; Units: U/L; Status: F                 

      Test: BILIRUBIN,TOTAL; Value: 0.8; Range: 0.2-1.0; Units: MG/DL; Status: F                  

      Test: BILIRUBIN,DIRECT; Value: 0.2; Range: 0.0-0.2; Units: MG/DL; Status: F                 

      Test: TOTAL PROTEIN; Value: 6.6; Range: 6.4-8.2; Units: GM/DL; Status: F                    

      Test: ALBUMIN; Value: 3.3; Range: 3.2-5.2; Units: GM/DL; Status: F                          

      Test: ALBUMIN/GLOBULIN RATIO; Value: 1.00; Range: 1.00-1.93; Status: F                      

Lab Order: MAGNESIUM LEVEL; SPEC'M 17 19:20                                                 

      Test: MAGNESIUM LEVEL; Value: 2.4; Range: 1.8-2.4; Units: MG/DL; Status: F                  

Lab Order: PATHOLOGIST REVIEW COMPREHENSI; SPEC'M 17 19:20                                  

      Test: SLIDE REVIEW; Value: Report; Status: F                                                

      Test: SOURCE; Value: PERIPHERAL SMEAR; Status: F                                            

      Test: REASON FOR REVIEW; Value: PANCYTOPENIA; Status: F                                     

      Test Note: &nbsp;; Slide and/or specimen referred to Pathologist for review. Results of   

      the review are located in the EMR Pathology module under Peripheral Smear when              

      completed.                                                                                  

                                                                                                  

Radiology Order: CT Head Without Contrast                                                         

      Test: CT Head Without Contrast                                                              

      REASON FOR EXAMINATION: Syncope; ; CLINICAL HISTORY: Syncope.; TECHNIQUE: Multiple          

      axial CT images were obtained through the brain without IV contrast material.;              

      COMMENTS:; There is normal configuration of sella turcica. There are no intra or            

      extra-axial collections. There; is no mass effect or midline shift. There is no             

      evidence of hematoma formation. No hydrocephalus is p; resent. The ventricles are           

      symmetrical. Bilateral basal ganglia calcifications are present.; There is diffuse          

      age-appropriate cerebellar and cerebral atrophy with proportionally dilated ventricl;       

      es and cortical sulci.; There are bilateral periventricular and subcortical white           

      matter hypolucencies compatible with mild c; hronic microvascular disease.; Otherwise,      

      no significant focal abnormalities are seen either in the posterior fossa or                

      supratentoria; l compartment.; IMPRESSION:; 1. Age-appropriate cerebellar and cerebral      

      atrophy.; 2. Mild chronic microvascular disease.; 3. No evidence of acute intracranial      

      pathology.; Thank you for your kind referral of this patient.; ; Electronically signed      

      by GORGE DURAN MD on 2017 07:55:20 PM ET;                                             

Outcome:                                                                                          

21:18 Decision to Hospitalize by Provider.                                                    [id number]

22:29 Discharge Assessment: patient administered narcotics - no. The following High Risk      [facility]

      Discharge criteria are identified: None. Admitted to PCU accompanied by nurse,              

      accompanied by tech, via stretcher, on monitor, with chart. Condition: good Condition:      

      stable Condition: improved. CT Study completed. Property :Personal belongings accompany     

      Pt.                                                                                         

22:42 Patient left the ED.                                                                    sls1

                                                                                                  

Signatures:                                                                                       

Dispatcher [employer]                           EDMS                                                 

Oli Kelley RN                   RN   mlb1                                                 

Shailesh Ramirez Matthew, DO                    DO   mm11                                                 

Mariella Kumar RN                     RN   sls1                                                 

[address]                            gr2                                                  

Nasreen CrandallRN                            RN   kas2                                                 

Boubacar Chavira, PCA                         PCA  jmv                                                  

                                                                                                  

Corrections: (The following items were deleted from the chart)                                    

19:06 19:05 EKG done. (by ED staff). maria del rosario mix 

                                                                                                  

**************************************************************************************************

MTDD

## 2017-01-19 VITALS — DIASTOLIC BLOOD PRESSURE: 61 MMHG | SYSTOLIC BLOOD PRESSURE: 133 MMHG

## 2017-01-19 VITALS — DIASTOLIC BLOOD PRESSURE: 55 MMHG | SYSTOLIC BLOOD PRESSURE: 106 MMHG

## 2017-01-19 VITALS — DIASTOLIC BLOOD PRESSURE: 62 MMHG | SYSTOLIC BLOOD PRESSURE: 123 MMHG

## 2017-01-19 VITALS — SYSTOLIC BLOOD PRESSURE: 116 MMHG | DIASTOLIC BLOOD PRESSURE: 62 MMHG

## 2017-01-19 VITALS — SYSTOLIC BLOOD PRESSURE: 127 MMHG | DIASTOLIC BLOOD PRESSURE: 64 MMHG

## 2017-01-19 LAB
ALBUMIN SERPL BCG-MCNC: 3 GM/DL (ref 3.2–5.2)
ALBUMIN/GLOB SERPL: 1.03 {RATIO} (ref 1–1.93)
ALP SERPL-CCNC: 61 U/L (ref 45–117)
ALT SERPL W P-5'-P-CCNC: 35 U/L (ref 12–78)
ANION GAP SERPL CALC-SCNC: 9 MEQ/L (ref 8–16)
AST SERPL-CCNC: 38 U/L (ref 15–37)
BASOPHILS # BLD AUTO: 0 K/MM3 (ref 0–0.2)
BASOPHILS NFR BLD AUTO: 0.3 % (ref 0–1)
BILIRUB SERPL-MCNC: 0.7 MG/DL (ref 0.2–1)
BUN SERPL-MCNC: 32 MG/DL (ref 7–18)
CALCIUM SERPL-MCNC: 7.5 MG/DL (ref 8.8–10.2)
CHLORIDE SERPL-SCNC: 106 MEQ/L (ref 98–107)
CO2 SERPL-SCNC: 28 MEQ/L (ref 21–32)
CREAT SERPL-MCNC: 1.49 MG/DL (ref 0.7–1.3)
EOSINOPHIL # BLD AUTO: 0.1 K/MM3 (ref 0–0.5)
EOSINOPHIL NFR BLD AUTO: 2.4 % (ref 0–3)
ERYTHROCYTE [DISTWIDTH] IN BLOOD BY AUTOMATED COUNT: 11.2 % (ref 11.5–14.5)
GFR SERPL CREATININE-BSD FRML MDRD: > 60 ML/MIN/{1.73_M2} (ref 49–?)
GLUCOSE SERPL-MCNC: 125 MG/DL (ref 80–110)
LARGE UNSTAINED CELL #: 0.1 K/MM3 (ref 0–0.4)
LARGE UNSTAINED CELL %: 3.3 % (ref 0–4)
LYMPHOCYTES # BLD AUTO: 0.8 K/MM3 (ref 1.5–4.5)
LYMPHOCYTES NFR BLD AUTO: 33.4 % (ref 24–44)
MAGNESIUM SERPL-MCNC: 2.3 MG/DL (ref 1.8–2.4)
MCH RBC QN AUTO: 32.2 PG (ref 27–33)
MCHC RBC AUTO-ENTMCNC: 34.8 G/DL (ref 32–36.5)
MCV RBC AUTO: 92.6 FL (ref 80–96)
MONOCYTES # BLD AUTO: 0.3 K/MM3 (ref 0–0.8)
MONOCYTES NFR BLD AUTO: 11.4 % (ref 0–5)
NEUTROPHILS # BLD AUTO: 1.2 K/MM3 (ref 1.8–7.7)
NEUTROPHILS NFR BLD AUTO: 49.2 % (ref 36–66)
PLATELET # BLD AUTO: 111 K/MM3 (ref 150–450)
POTASSIUM SERPL-SCNC: 3.3 MEQ/L (ref 3.5–5.1)
PROT SERPL-MCNC: 5.9 GM/DL (ref 6.4–8.2)
SODIUM SERPL-SCNC: 143 MEQ/L (ref 136–145)
WBC # BLD AUTO: 2.4 K/MM3 (ref 4–10)

## 2017-01-19 RX ADMIN — SODIUM CHLORIDE SCH UNITS: 4.5 INJECTION, SOLUTION INTRAVENOUS at 13:09

## 2017-01-19 RX ADMIN — Medication SCH MG: at 08:05

## 2017-01-19 RX ADMIN — INSULIN LISPRO SCH UNITS: 100 INJECTION, SOLUTION INTRAVENOUS; SUBCUTANEOUS at 17:35

## 2017-01-19 RX ADMIN — OMEPRAZOLE SCH MG: 20 CAPSULE, DELAYED RELEASE ORAL at 08:05

## 2017-01-19 RX ADMIN — INSULIN LISPRO SCH UNITS: 100 INJECTION, SOLUTION INTRAVENOUS; SUBCUTANEOUS at 13:10

## 2017-01-19 RX ADMIN — INSULIN LISPRO SCH UNITS: 100 INJECTION, SOLUTION INTRAVENOUS; SUBCUTANEOUS at 08:05

## 2017-01-19 RX ADMIN — SODIUM CHLORIDE SCH UNITS: 4.5 INJECTION, SOLUTION INTRAVENOUS at 20:28

## 2017-01-19 NOTE — REP
MRI BRAIN WITHOUT CONTRAST:

 

HISTORY:  Syncope.

 

COMPARISON:  09/29/2009.

 

Areas of increased signal intensity on T2-weighted images are present in the

periventricular and subcortical white matter and butch.  This represents small

vessel ischemic disease.  There is no intraparenchymal hemorrhage, infarct, mass

or midline shift.  The ventricular system is normal in appearance.  The cortical

sulci are dilated consistent with minimal volume loss.  There is no extracerebral

collection.  Mucosal thickening is present in the ethmoid and maxillary sinuses.

 

 

IMPRESSION:

 

1.  Small vessel ischemic disease.

 

2.  Minimal volume loss.

 

 

Signed by

Gary Chew MD 01/19/2017 10:05 A

## 2017-01-19 NOTE — REP
Chest, single AP view, the patient supine:

 

Comparison is 10/22/2016.

 

There are no infiltrates, masses or effusions.  Lung fields are clear and

unchanged.

 

Cardiac size is normal.  The tigist, mediastinum, and bony thorax are unremarkable

for positioning.

 

Impression:

 

No acute cardiopulmonary findings.

 

 

Signed by

Brandin Pandya MD 01/18/2017 08:33 P

## 2017-01-19 NOTE — REP
MRA BRAIN WITHOUT CONTRAST:

 

HISTORY:  Syncope.

 

3D time-of-flight MR angiography was performed at the level of the [place] of

[place].  There is no aneurysm or arteriovenous malformation.  Mild

atherosclerotic disease involves the cavernous and supraclinoid internal carotid

arteries.  Major intracranial vessels are patent.  The left vertebral artery is

dominant.

 

IMPRESSION:

 

1.  There is no aneurysm or arteriovenous malformation.

 

2.  Atherosclerotic disease as described above.

 

 

Signed by

Gary Chew MD 01/19/2017 10:13 A

## 2017-01-19 NOTE — ECGEPIP
Stationary ECG Study

                           [facility] - ED

                                       

                                       Test Date:    2017

Pat Name:     SAULO CHARLES            Department:   

Patient ID:   [id number]                 Room:         [place] [address]

Gender:       M                        Technician:   shandra

:          1945               Requested By: David BENAVIDEZ

Order Number: [id number]     Reading MD:   David Grant

                                 Measurements

Intervals                              Axis          

Rate:         82                       P:            65

NE:           163                      QRS:          25

QRSD:         84                       T:            45

QT:           361                                    

QTc:          422                                    

                           Interpretive Statements

SINUS RHYTHM

 

Electronically Signed On 2017 8:32:57 EST by David Grant

## 2017-01-19 NOTE — REP
Left TIB-fib MRI without and with IV gadolinium:

 

History:  Shin lesions seen on plain radiographs.

 

Gadolinium enhancement dose 18.5 ml of ProHance is administered.

 

Technique:  Axial T1- and T2-weighted scans were obtained.  Coronal T1- and

T2-weighted scans were obtained.  T1 sagittal post-gadolinium enhanced images

obtained.  T1 axial post gadolinium enhanced images obtained.  There was some

motion artifacts on one or two of the sequences but the patient declined repeat

imaging.

 

Findings:  There is no evidence of cortical disruption or periosteal reaction.

No marrow edema is seen.  There are several foci of low T1, high T2 signal in the

central cancellous marrow of the distal femur and proximal tibia on the left.

Similar findings are seen more extensively in the distal femur on the right.  In

addition, on the right there is a subchondral lesion in the lateral femoral

condyle.  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  There

is no evidence of aggressive bone lesion.  Contrast enhanced study shows no

abnormal gadolinium enhancement.

 

Impression:

 

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  These bilaterally affect the distal

femur and the left tibia, both proximally and distally.

 

 

Signed by

Dwayne Giraldo MD 01/20/2017 08:29 A

## 2017-01-19 NOTE — REP
MRI study of the left knee without and with IV contrast:

 

History:  Lesion.

 

Comparison TIB-fib views are from 01/18/2017.

 

Technique:  Axial, coronal, and sagittal imaging planes are utilized.  T1 and

T2-weighted scans are obtained in the usual fashion with and without fat

saturation.

 

MRI contrast dose:  18.5 mL of intravenous ProHance is given.

 

Left knee MRI findings:  There is a small to moderate-sized joint effusion in the

left knee.  No Pretty's cyst is seen.  Patellar and quadriceps tendons are intact.

Posterior cruciate ligament has an intact appearance.  The anterior cruciate

ligament is not seen consistent with an old tear.  There is no evidence of medial

or lateral collateral ligament disruption.  There is chondromalacia mild in

degree in the medial and lateral tibiofemoral compartments.  An extensive

degenerative tear is seen in the medial meniscus which is diminutive.  No lateral

meniscal tear is appreciated.  Postcontrast enhanced studies shows no abnormal

contrast enhancement.  There is some central areas of increased T2 decreased T1

signal intensity in the cancellous bone of the distal femur and proximal tibia

consistent with small areas of benign marrow change compatible with a small cysts

or small enchondromas.

 

Impression:

 

Moderate joint effusion.  Old anterior cruciate ligament tear. Tendinosis change

in the posterior cruciate ligament.  Small bone cyst versus benign enchondromas

in the distal femur and proximal tibia.  Chondromalacia and early osteoarthritis

changes.

 

 

Signed by

Dwayne Giraldo MD 01/20/2017 08:29 A

## 2017-01-19 NOTE — REP
Left tibia-fibula four views AP and lateral projections:

 

There is no acute fracture or dislocation.

 

There are serpiginous lesions in the distal femur and proximal tibia,

nonspecific, possibly bone infarcts versus enchondromas.

 

Study is otherwise unremarkable.

 

Follow-up radionuclide bone scan or MRI might be considered.

 

 

Signed by

Brandin Pandya MD 01/18/2017 08:36 P

## 2017-01-19 NOTE — ECHO
DATE OF PROCEDURE: 01/19/2017

 

AGE: 71

GENDER: Male

HEIGHT: 73 inches

WEIGHT: 198 pounds

BODY SURFACE AREA: 2.14 sq m

 

PATIENT LOCATION: Inpatient, PCU, room 3226

 

REFERRING PHYSICIAN: Mayur Mckeon MD

 

INDICATION:   Syncope.

 

2-D MEASUREMENTS:

RV: 3.8 cm

LV: 4.7 cm

Septum: 0.9 cm

Posterior wall: 0.9 cm

Aortic root: 3.3 cm

LA: 3.2 cm

LVEF: 60%

 

DOPPLER MEASUREMENTS:

AV: 1.1 m/s

LVOT: 0.8 m/s

LVOT diameter: 2.2 cm

MV-E: 50, A: 62, EA ratio: 0.8

Early mitral deceleration time: 194 ms

E prime 5.6, A prime 8.5, E/E prime ratio: 9

PV: 0.7 m/s

Pulmonary artery acceleration time: 102 ms

PASP: 33 mmHg

IVC: 1.8 cm

 

COMMENTS:

Normal sinus rhythm without intraventricular conduction disturbance.

 

Normal cardiac chamber sizes and left ventricle (LV) wall thickness.  On

real-time imaging from the parasternal and apical projections wall motion was

symmetrical and normal.  Slightly thickened mitral annulus, but normal leaflet

thickness and excursion.  Slight thickening of the subchordal papillary heads,

but no pedunculated mass.  Three equal sized aortic cusps with marginally

thickened cusp edges, but adequate cusp separation.  Normal aortic root size.  No

apparent intracardiac mass or pericardial effusion.

 

Color flow Doppler study taken from the parasternal and apical projections showed

no mitral, aortic, and only trace tricuspid insufficiency.

 

Guided continuous wave Doppler of his aortic valve showed a normal peak systolic

velocity against LV outflow tract obstruction.

 

Pulsed and continuous wave Doppler of his LV inflow tract taken from the apical

four-chamber projection showed normal diastolic filling velocities against mitral

stenosis.  There was slightly more prominent late diastolic/atrial dependent

filling pattern.  Degree of diastolic dysfunction was confirmed using tissue

Doppler of his mitral annulus, but currently estimated mean left atrial pressure

was well within normal limits.

 

Pulsed and continuous wave Doppler of his pulmonary trunk showed a normal peak

systolic velocity against right ventricle (RV) outflow tract obstruction.  His

pulmonary artery acceleration time was marginally abbreviated suggestive of a

slightly increased pulmonary vascular resistance and perhaps very mild pulmonary

hypertension.

 

We attempted to further estimate his right ventricular systolic pressure using

guided continuous wave Doppler of his tricuspid valve, but could not get a clear

spectral envelope.  His inferior vena cava was of normal size with normal

respiratory collapse against an elevated central venous pressure.

 

CONCLUSIONS:

Unable to detect a structural or functional cardiac abnormality to account for

the patient's syncopal spell.

 

Echocardiographic findings not outside normal limits for his age.

## 2017-01-19 NOTE — IPN
DATE:  01/19/2017

 

SUBJECTIVE:

Patient seen and examined. Reported some right hip pain and also left knee pain;

as per patient, might be secondary to fall. Denies any chest pain, pressure,

discomfort. Denies any fevers or chills. Orthostatic hypotension has resolved,

was negative.

 

VITAL SIGNS:

Temperature 96.9, pulse 76, respirations 18, blood pressure 133/61, pulse

oximetry 93% on room air.

 

LABORATORY DATA:

WBC 2.4, hemoglobin and hematocrit 11.1 over 31.9, platelets 111.

 

PHYSICAL EXAMINATION:

GENERAL: Patient alert and oriented times three. In no acute distress.

HEENT: Normocephalic, atraumatic. Moist mucous membranes.

PULMONARY: Bilaterally clear to auscultation.

CARDIAC:  Regular rate and rhythm. Normal S1, S2. No murmurs detected.

ABDOMEN: Soft, nontender, nondistended. Positive bowel sounds.

EXTREMITIES: No edema bilateral lower extremities.

NEUROLOGIC: Cranial nerves II through XII grossly intact. No focal deficits.

 

ASSESSMENT AND PLAN:

This is a 71-year-old male patient with underlying medical history of anemia,

type 2 diabetes, hypertension, history of pyelonephritis, kidney stones, admitted

with syncope and collapse, one episode, no previous symptoms.

1. Syncope and collapse, cardiac versus neurogenic. Followup electroencephalogram

(EEG), MRI. Cardiac enzymes appreciated negative. Telemetry monitoring.

Orthostatic vital signs. Intravenous (IV) fluids initially provided. Orthostatic

vital signs currently negative. Withholding blood pressure medications.

Echocardiogram is appreciated. Physical therapy.

2. Orthostatic cause possible medication in the setting of upper respiratory

infection (URI). Holding blood pressure medications. IV fluids initially

provided. Monitor blood pressure.

3. Upper respiratory infection. Influenza, respiratory panel. Continue to

monitor. Followup cultures.

4. Leukopenic, possibly secondary to viral illness. Will get peripheral smears.

5. Diabetes. Insulin as per protocol.

6. Elevated prostate-specific antigen (PSA). Outpatient followup.

7. Unusual findings on the x-ray of the lesions on the left knee and right hip

avascular necrosis. Will consult orthopedics, physical therapy.

8. Deep venous thrombosis (DVT) prophylaxis. Heparin subcutaneously.

 

DISPOSITION PLANNING:  Pending EEG, orthopedic consultation.

## 2017-01-19 NOTE — REP
Right hip:  Two views.

 

History:  Injury in a fall.

 

Findings:  AP and frog-leg views of the right hip demonstrate a mottled sclerotic

pattern in the femoral head suggestive of avascular necrosis of the femoral head.

No flattening is seen.  There is osteoarthritic spurring superolaterally on the

femur and at the acetabular margins superiorly.  Joint space is preserved.

Periarticular soft tissues are unremarkable.  There is some vascular

calcification.

 

Impression:

 

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  No fracture seen.

Mild osteoarthritic spurring.

 

 

Signed by

Dwayne Giraldo MD 01/19/2017 03:08 P

## 2017-01-20 VITALS — SYSTOLIC BLOOD PRESSURE: 132 MMHG | DIASTOLIC BLOOD PRESSURE: 68 MMHG

## 2017-01-20 VITALS — SYSTOLIC BLOOD PRESSURE: 127 MMHG | DIASTOLIC BLOOD PRESSURE: 67 MMHG

## 2017-01-20 VITALS — DIASTOLIC BLOOD PRESSURE: 72 MMHG | SYSTOLIC BLOOD PRESSURE: 128 MMHG

## 2017-01-20 LAB
ALBUMIN SERPL BCG-MCNC: 3 GM/DL (ref 3.2–5.2)
ALBUMIN/GLOB SERPL: 0.91 {RATIO} (ref 1–1.93)
ALP SERPL-CCNC: 71 U/L (ref 45–117)
ALT SERPL W P-5'-P-CCNC: 34 U/L (ref 12–78)
ANION GAP SERPL CALC-SCNC: 8 MEQ/L (ref 8–16)
AST SERPL-CCNC: 32 U/L (ref 15–37)
BASOPHILS # BLD AUTO: 0 K/MM3 (ref 0–0.2)
BASOPHILS NFR BLD AUTO: 0.6 % (ref 0–1)
BILIRUB SERPL-MCNC: 0.6 MG/DL (ref 0.2–1)
BUN SERPL-MCNC: 23 MG/DL (ref 7–18)
CALCIUM SERPL-MCNC: 8 MG/DL (ref 8.8–10.2)
CHLORIDE SERPL-SCNC: 109 MEQ/L (ref 98–107)
CO2 SERPL-SCNC: 28 MEQ/L (ref 21–32)
CREAT SERPL-MCNC: 1.16 MG/DL (ref 0.7–1.3)
EOSINOPHIL # BLD AUTO: 0 K/MM3 (ref 0–0.5)
EOSINOPHIL NFR BLD AUTO: 2.4 % (ref 0–3)
ERYTHROCYTE [DISTWIDTH] IN BLOOD BY AUTOMATED COUNT: 11.2 % (ref 11.5–14.5)
GFR SERPL CREATININE-BSD FRML MDRD: > 60 ML/MIN/{1.73_M2} (ref 49–?)
GLUCOSE SERPL-MCNC: 120 MG/DL (ref 80–110)
LARGE UNSTAINED CELL #: 0.1 K/MM3 (ref 0–0.4)
LARGE UNSTAINED CELL %: 4.1 % (ref 0–4)
LYMPHOCYTES # BLD AUTO: 0.8 K/MM3 (ref 1.5–4.5)
LYMPHOCYTES NFR BLD AUTO: 39.8 % (ref 24–44)
MAGNESIUM SERPL-MCNC: 2.1 MG/DL (ref 1.8–2.4)
MCH RBC QN AUTO: 31.4 PG (ref 27–33)
MCHC RBC AUTO-ENTMCNC: 33.5 G/DL (ref 32–36.5)
MCV RBC AUTO: 93.7 FL (ref 80–96)
MONOCYTES # BLD AUTO: 0.2 K/MM3 (ref 0–0.8)
MONOCYTES NFR BLD AUTO: 9 % (ref 0–5)
NEUTROPHILS # BLD AUTO: 0.9 K/MM3 (ref 1.8–7.7)
NEUTROPHILS NFR BLD AUTO: 44.1 % (ref 36–66)
PLATELET # BLD AUTO: 111 K/MM3 (ref 150–450)
POTASSIUM SERPL-SCNC: 3.7 MEQ/L (ref 3.5–5.1)
PROT SERPL-MCNC: 6.3 GM/DL (ref 6.4–8.2)
SODIUM SERPL-SCNC: 145 MEQ/L (ref 136–145)
WBC # BLD AUTO: 2.1 K/MM3 (ref 4–10)

## 2017-01-20 RX ADMIN — INSULIN LISPRO SCH UNITS: 100 INJECTION, SOLUTION INTRAVENOUS; SUBCUTANEOUS at 12:16

## 2017-01-20 RX ADMIN — SODIUM CHLORIDE SCH UNITS: 4.5 INJECTION, SOLUTION INTRAVENOUS at 08:14

## 2017-01-20 RX ADMIN — OMEPRAZOLE SCH MG: 20 CAPSULE, DELAYED RELEASE ORAL at 08:14

## 2017-01-20 RX ADMIN — INSULIN LISPRO SCH UNITS: 100 INJECTION, SOLUTION INTRAVENOUS; SUBCUTANEOUS at 08:14

## 2017-01-20 RX ADMIN — Medication SCH MG: at 08:14

## 2017-01-20 NOTE — EDDOCDS
Physician Documentation                                                                           

[facility]                                                                         

Name: Jose Antonio Sam                                                                               

Age: 71 yrs                                                                                       

Sex: Male                                                                                         

: 1945                                                                                   

MRN: [id number]                                                                                     

Arrival Date: 2017                                                                          

Time: 18:30                                                                                       

Account#: [id number]                                                                              

Bed 11                                                                                            

Private MD: Marry Steele A                                                                     

Disposition:                                                                                      

17 21:18 Hospitalization ordered by Mayur Mckeon for Inpatient Admission. Preliminary       

diagnosis are Syncope and collapse, Hypotension, Pancytopenia.                                  

- Bed requested for  PCU.                                                                        

- Status is Inpatient Admission.                                                              [id number]

- Condition is Stable.                                                                            

- Problem is an acute exacerbation.                                                               

- Symptoms have improved.                                                                         

                                                                                                  

                                                                                                  

                                                                                                  

Historical:                                                                                       

- Allergies: no known allergies;                                                                  

- Home Meds:                                                                                      

1. aspirin 81 mg Oral TbEC once daily on hold                                                   

2. Drisdol 50,000 unit Oral cap every 2 weeks                                                   

3. Hyzaar 100-12.5 mg Oral tab 1 tab once daily                                                 

4. Tiazac 120 mg Oral cpER 1 cap once daily                                                     

5. vitamin b12 inj monthly                                                                      

6. Vitamin B-6 100 mg Oral tab                                                                  

- PMHx: Anemia; Diabetes - NIDDM: controlled; Hypertension; Pyelonephritis;                       

- PSHx: repair of stomach ulcers;                                                                 

- Social history: Smoking status: Patient states former smoker of tobacco. No barriers            

to communication noted, The patient speaks fluent English, Speaks appropriately for             

age.                                                                                            

- Family history: Not pertinent.                                                                  

- : The pt / caregiver states he / she is not on anticoagulants. Home medication list             

is obtained from the patient.                                                                   

- Exposure Risk Screening:: None identified.                                                      

                                                                                                  

                                                                                                  

Vital Signs:                                                                                      

                                                                                             

18:32 BP 95 / 59; Pulse 87; Resp 18 S; Temp 98.6(O); Pulse Ox 95% on R/A; Weight 92.99 kg /   gr2 

      205.01 lbs (R); Height 6 ft. 1 in. (185.42 cm) (R); Pain 7/10;                              

18:42  / 56 (auto/);                                                                    kas2

18:45 Pulse 80 MON; Pulse Ox 95% ;                                                            kas2

18:45 Resp 18; Temp 98.2(O); Pain 0/10;                                                       kas2

18:57  / 58 (auto/);                                                                    kas2

18:57 Pulse 84 MON; Pulse Ox 93% ;                                                            kas2

19:12  / 60 (auto/);                                                                    kas2

19:12 Pulse 88 MON; Pulse Ox 96% ;                                                            kas2

19:27  / 61 (auto/);                                                                    kas2

19:27 Pulse 82 MON; Pulse Ox 96% ;                                                            kas2

19:42 BP 99 / 50 (auto/);                                                                     kas2

19:42 Pulse 84 MON; Pulse Ox 94% ;                                                            kas2

19:57  / 59 (auto/);                                                                    kas2

19:57 Pulse 80 MON; Pulse Ox 93% ;                                                            kas2

20:12 BP 98 / 54 RA Supine; Pulse 77; Resp 18; Temp 96.2; Pulse Ox 92% on R/A; Pain 0/10;     kas2

20:12 BP 83 / 53 RA Sitting; Pulse 86; Resp 18; Pulse Ox 94% on R/A;                          kas2

20:12 BP 87 / 54 RA Standing; Pulse 95; Resp 18; Pulse Ox 97% on R/A;                         kas2

20:12  / 60 (auto/);                                                                    kas2

20:12 Pulse 76 MON; Pulse Ox 93% ;                                                            kas2

20:27  / 59 (auto/);                                                                    kas2

20:27 Pulse 74 MON; Pulse Ox 99% ;                                                            kas2

20:42  / 59 (auto/);                                                                    kas2

20:42 Pulse 74 MON; Pulse Ox 92% ;                                                            kas2

20:57  / 58 (auto/);                                                                    kas2

20:57 Pulse 74 MON; Pulse Ox 96% ;                                                            kas2

21:12  / 61 (auto/);                                                                    kas2

21:12 Pulse 74 MON; Pulse Ox 95% ;                                                            kas2

21:27  / 62 (auto/);                                                                    kas2

21:27 Pulse 76 MON; Pulse Ox 96% ;                                                            kas2

21:42  / 59 (auto/);                                                                    kas2

21:42 Pulse 78 MON; Pulse Ox 98% ;                                                            kas2

21:57  / 66 (auto/);                                                                    kas2

21:57 Pulse 78 MON; Pulse Ox 98% ;                                                            kas2

22:12  / 59 (auto/);                                                                    kas2

22:12 Pulse 78 MON; Pulse Ox 96% ;                                                            kas2

22:15  / 59; Pulse 78; Resp 18; Temp 97.9; Pulse Ox 98% on R/A; Pain 0/10;              kas2

18:32 Body Mass Index 27.05 (92.99 kg, 185.42 cm)                                             gr2 

                                                                                                  

MDM:                                                                                              

18:55 ECG WITH READING ER PHYS+CARDIAG ordered.                                               EDMS

19:06 Fingerstick Blood Sugar Ordered.                                                        EDMS

19:35 Cardiac Monitor/Pulse Ox/q 15 min VS ordered.                                           mm11

19:35 IV Saline Lock ordered.                                                                 mm11

19:35 Orthostatic VS ordered.                                                                 mm11

19:35 Rhythm Strip to chart ordered.                                                          mm11

19:35 Basic Metabolic Profile Ordered.                                                        EDMS

19:35 CBC with Diff Ordered.                                                                  EDMS

19:35 Cardiac Injury Profile Ordered.                                                         EDMS

19:36 Thyroid Stimulating Hormone Ordered.                                                    EDMS

19:36 Troponin Ordered.                                                                       EDMS

19:36 Urinalysis Ordered.                                                                     EDMS

19:36 Urine Culture Ordered.                                                                  EDMS

19:36 Chest, 1 View Ordered.                                                                  EDMS

19:36 Tibia/Fibula Ordered.                                                                   EDMS

19:36 CT Head Without Contrast Ordered.                                                       EDMS

19:39 Financial registration complete.                                                        zo  

19:40 [facility] Payment Agreement was scanned into [employer] Capture and attached to record.               zo  

20:31 Fingerstick Blood Sugar Reviewed.                                                       mm11

20:31 Basic Metabolic Profile Reviewed.                                                       mm11

20:31 CBC with Diff Reviewed.                                                                 mm11

20:31 Cardiac Injury Profile Reviewed.                                                        mm11

20:31 Thyroid Stimulating Hormone Reviewed.                                                   mm11

20:31 Troponin Reviewed.                                                                      mm11

20:35 NS 0.9% 1000 ml IV at bolus once ordered.                                               mm11

21:05 Urinalysis Reviewed.                                                                    mm11

21:05 CT Head Without Contrast Reviewed.                                                      mm11

21:07 BED REQUEST+ADM ordered.                                                                EDMS

21:14 Hemoglobin A1c Ordered.                                                                 EDMS

21:51 Hemoglobin A1c Reviewed.                                                                mm11

21:57 Admission / Observation Status ordered.                                                 EDMS

21:57 ECHOCARD,DOPPLER/COLOR FLOW ordered.                                                    EDMS

21:57 ELECTROCARDIOGRAM ADULT ordered.                                                        EDMS

21:57 CONSISTENT CARBOHYDRATES ordered.                                                       EDMS

21:57 CBC WITH DIFFERENTIAL Ordered.                                                          EDMS

21:58 COMPLETE COMPHRENSIVE METABOLI Ordered.                                                 EDMS

21:58 MAGNESIUM LEVEL Ordered.                                                                EDMS

22:01 INFLUENZA A&B RAPID ANTIGEN Ordered.                                                    EDMS

22:03 CARDIAC MARKER PANEL Ordered.                                                           EDMS

22:03 IONIZED CALCIUM Ordered.                                                                EDMS

22:04 LIVER PROFILE Ordered.                                                                  EDMS

22:05 MAGNESIUM LEVEL Ordered.                                                                EDMS

22:05 PATHOLOGIST REVIEW COMPREHENSI Ordered.                                                 EDMS

22:19 MRA BRAIN W/O CONTRAST Ordered.                                                         EDMS

22:19 MRI Brain without Contrast Ordered.                                                     EDMS

                                                                                             

08:59 T-Sheet-- Draft Copy was scanned into [employer] Capture and attached to record.                   gb  

08:59 Trend VS was scanned into [employer] Capture and attached to record.                               gb  

08:59 Radiology Report was scanned into [employer] Capture and attached to record.                       gb  

                                                                                                  

Administered Medications:                                                                         

                                                                                             

20:45 Drug: NS 0.9% 1000 ml [sodium chloride 0.9 % intravenous solution] Route: IV; Rate:     kas2

      bolus; Site: right forearm;                                                                 

                                                                                                  

                                                                                                  

Signatures:                                                                                       

Dispatcher [employer]                           EDMS                                                 

Rosi Segal RN            RN   Selena Eckert, Reg                  Reg  Oli Franks RN                   RN   mlb1                                                 

Shailesh Ramirez Matthew, DO                    DO   mm11                                                 

Mariella Kumar RN                     RN   sls1                                                 

Nasreen Crandall RN                            RN   kas2                                                 

                                                                                                  

The chart was reviewed and I authenticate all verbal orders and agree with the evaluation and 
treatment provided.Corrections: (The following items were deleted from the chart)

22:02 22:01 LIVER PROFILE ordered. EDMS                                                       EDMS

22:04 22:02 MAGNESIUM LEVEL ordered. EDMS                                                     EDMS

                                                                                                  

Attachments:                                                                                      

19:40 [facility] Payment Agreement                                                                zo  

                                                                                             

08:59 T-Sheet-- Draft Copy                                                                    gb  

                                                                                                  

**************************************************************************************************



*** Chart Complete ***
MTDD

## 2017-01-20 NOTE — CR
DATE OF CONSULTATION: 01/20/2017

 

REASON FOR CONSULTATION: A fall with right hip and left knee pain with

radiographic abnormalities on plain films and MRI scan.

 

HISTORY OF THE PRESENT ILLNESS: He is a 71-year-old male who had an apparent

syncopal episode and was admitted to the hospital for that and evaluated by Dr. Mckeon. He does not remember specifically the events, but he presented to the

emergency room on 01/18/2017 with a chief complaint that he fell. His syncopal

workup has been ongoing, and he is apparently ready to be discharged home.  But

because during the course of the evaluation of injuries of his hip and knee, he

was noted to have abnormal findings on the x-ray of his right hip as well as the

right knee, so subsequent MR scanning was done.  After his MRI scans were

completed, they called me to evaluate him. He is actually doing quite a bit

better, still has some isolated soreness in the outside aspect of the left knee

and sometimes he gets some pain in the posterior buttock of the right hip. He

says he has had pain in the right hip for quite some time. He has had a relevant

history as he had a distant injury, football related, to the left knee requiring

surgery. He otherwise is still quite active. 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

 

His past medical history otherwise is one for diabetes, anemia, hypertension,

history of pyelonephritis, history of elevated prostate-specific antigen (PSA)

and kidney stones. Other relevant history: He does have sickle cell trait; I am

not clear if he has sickle cell anemia.

 

Past surgical history: He has had ulcer surgeries and urologic surgeries in the

past.

 

He quit smoking many years ago.

 

A note from Dr. Mckeon's history and physical otherwise is referred to for family

history, review of systems and other relevant past medical history.

 

When I examined him, he is ambulating with a slight limp, favoring that left

knee. He is alert.  He is oriented.

His blood pressure was 132/68 with a pulse of 81, respiration rate 18,

temperature is 96.5, oxygen saturation is 97% on room air.

Left knee has trace effusion and healed scar longitudinally along the medial

joint line with some tenderness laterally, but there is no gross instability 
when

I do a varus-valgus stress test. Slight increased Lachman. Slight flexion

contracture. He bends back to 120 degrees. Distal neurovascular exam is normal.

Right hip has some mild irritability to internal/external rotation. There is 
some

mild tenderness over the buttock and trochanter on the right side.

Distal neurovascular exam is entirely normal.

 

His radiographic study of his right hip shows osteoarthritis, evidence of prior

osteonecrosis. No acute fractures are seen.

 

Left tibia-fibula x-ray shows bone infarcts in the distal femur and the tibia

with some mild arthritis of the knee joint.

 

MR scan is consistent with what appears to be bone infarcts in his tibia and his

femur.

 

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

ligament (ACL) tear and some mild early arthritis but no acute findings are 
seen.

 

 

IMPRESSION:

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

with a new sprain of his left knee, with prior football related anterior 
cruciate

ligament deficiency. I do not detect any significant orthopedic surgical

intervention type pathology that is needed presently. I would just treat this

expectantly with gentle motion and followup as an outpatient in a couple of 
weeks

to see how he responds to that. He is comfortable with that plan.

 

In terms of the right hip, clearly he has gone on to develop some osteoarthritis
,

probably secondary to his underlying osteonecrosis, and I think all his reasons

for getting bone infarcts and osteonecrosis of his right hip and his left femur

and tibia are likely related to his underlying sickle cell anemia, but it does

not require any acute management presently and we can follow this as an

outpatient. He is quite happy with that plan. We plan to see him in

2 or 3 weeks in my office.

FRANCISCO

## 2017-01-20 NOTE — EEG
DATE OF PROCEDURE:  01/19/2017

 

REFERRING PHYSICIAN:  Dr. Prudence Avila

 

DIAGNOSIS:  Syncope.

 

EEG #:  17 - 23.

 

CLINICAL HISTORY:  The patient is a 71-year-old man who was admitted at [facility] after a passing out spell.  He had fever and cough and took

over-the-counter medications.  This EEG was done to rule out epileptic potential.

He is currently taking Prilosec, pyrodoxime, Lovenox, etc.

 

TECHNICAL DESCRIPTION:  This digital EEG was recorded by 21 scalp, ear and two

EKG electrodes and was reviewed in bipolar and referential montages following

reformatting in 10-20 international electrode placement system.

 

INTERPRETATION:  The patient was noted to be in awake and drowsy states during

this EEG.  Resting awake background consisted of well-formed posterior dominant

rhythm with anterior/posterior gradient comprising of 9 Hz alpha activity

measuring 15-40 microvolts in amplitude which was symmetric and reactive to eye

opening.  Attenuation of posterior dominant rhythm was seen during transition

into drowsiness.  No sleep was achieved.  Hyperventilation with little effort

remained unremarkable.  Photic stimulation remained unremarkable.  EKG revealed

normal sinus rhythm.  No focal, lateralizing or epileptiform abnormalities were

seen.  No clinical or electrographic seizures were recorded.

 

CONCLUSION:  This EEG in awake and drowsy states is within normal limits.

## 2017-01-20 NOTE — EDDOCDS
Nurse's Notes                                                                                     

[facility]                                                                         

Name: Jose Antonio Sam                                                                               

Age: 71 yrs                                                                                       

Sex: Male                                                                                         

: 1945                                                                                   

MRN: [id number]                                                                                     

Arrival Date: 2017                                                                          

Time: 18:30                                                                                       

Account#: [id number]                                                                              

Bed 11                                                                                            

Private MD: Marry Steele A                                                                     

Diagnosis: Syncope and collapse;Hypotension;Pancytopenia                                          

                                                                                                  

Presentation:                                                                                     

                                                                                             

18:36 Presenting complaint: Patient states: Syncopal episode at home at 1430 today c/o pain   mlb1

      left knee. Adult Sepsis Screening: The patient does not have new or worsening altered       

      mentation. Patient's respiratory rate is less than 22. Systolic blood pressure is           

      greater than 100. Patient has a qSOFA score of 0- Negative Sepsis Screen.                   

      Suicide/Homicide risk assessment- the patient denies having any suicidal and/or             

      homicidal ideations and does not present with any other emotional, behavioral or mental     

      health complaints.  Status: Patient is not a  or              

      dependent. Transition of care: patient was not received from another setting of care.       

18:36 Acuity: MIRIAN Level 3                                                                     mlb1

18:36 Method Of Arrival: Walkin/Carried/Asstd                                                 mlb1

                                                                                                  

Triage Assessment:                                                                                

18:39 General: Appears in no apparent distress, Behavior is appropriate for age, cooperative. mlb1

      Pain: Location: lateral aspect of left knee Pain currently is 8 out of 10 on a pain         

      scale. Neurological: Level of Consciousness is awake, alert, Oriented to person, place,     

      time.                                                                                       

                                                                                                  

Historical:                                                                                       

- Allergies: no known allergies;                                                                  

- Home Meds:                                                                                      

1. aspirin 81 mg Oral TbEC once daily on hold                                                   

2. Drisdol 50,000 unit Oral cap every 2 weeks                                                   

3. Hyzaar 100-12.5 mg Oral tab 1 tab once daily                                                 

4. Tiazac 120 mg Oral cpER 1 cap once daily                                                     

5. vitamin b12 inj monthly                                                                      

6. Vitamin B-6 100 mg Oral tab                                                                  

- PMHx: Anemia; Diabetes - NIDDM: controlled; Hypertension; Pyelonephritis;                       

- PSHx: repair of stomach ulcers;                                                                 

- Social history: Smoking status: Patient states former smoker of tobacco. No barriers            

to communication noted, The patient speaks fluent English, Speaks appropriately for             

age.                                                                                            

- Family history: Not pertinent.                                                                  

- : The pt / caregiver states he / she is not on anticoagulants. Home medication list             

is obtained from the patient.                                                                   

- Exposure Risk Screening:: None identified.                                                      

                                                                                                  

                                                                                                  

Screenin:41 Screening information is obtained from the patient. Fall risk: No risks identified.     kas2

      Assistance ADL's: requires no assistance with activities of daily living. Abuse/DV          

      Screen: The patient / caregiver reports he/she is: not in a situation that causes fear,     

      pain or injury. Nutritional screening: No deficits noted. Advance Directives:               

      Currently, there is no health care proxy. There is no active DNR order. There is no         

      living will. There is no Power of . home support is adequate.                       

                                                                                                  

Assessment:                                                                                       

19:35 General: Patient gone to CT scan via stretcher with tech..                              kas2

19:40 General: Appears in no apparent distress, comfortable, well nourished, well groomed,    kas2

      Behavior is appropriate for age, cooperative. Pain: Denies pain. Neurological: Level of     

      Consciousness is awake, alert, Oriented to person, place, time,  are equal             

      bilaterally Moves all extremities. Speech is normal, Facial symmetry appears normal,        

      Pupils are PERRLA. Cardiovascular: Capillary refill < 3 seconds Heart tones S1 S2           

      present Rhythm is sinus rhythm No ectopy. Respiratory: Airway is patent Respiratory         

      effort is even, unlabored, Respiratory pattern is regular, symmetrical, Breath sounds       

      are clear bilaterally. Derm: Skin is intact, is healthy with good turgor, Skin is dry,      

      Skin temperature is warm.                                                                   

20:14 General:.                                                                               kas2

20:15 General: Patient back from CT scan via stretcher with tech. Resettled in bed. Patient   kas2

      denies pain or discomfort at this time. No apparent distress. Family at bedside. Call       

      bell within reach. Will continue to monitor..                                               

21:25 General: Patient laying in bed with family at bedside. No apparent distress noted.      kas2

      Patient denies pain or discomfort at this time. Airway patent and respiratory pattern       

      even and unlabored. Call bell within reach. Will continue to monitor..                      

22:13 General: Appears in no apparent distress, comfortable, Behavior is appropriate for age, kas2

      cooperative. Pain: Denies pain. Neurological: Level of Consciousness is awake, alert,       

      Oriented to person, place, time. Cardiovascular: Capillary refill < 3 seconds Heart         

      tones S1 S2 present Rhythm is sinus rhythm No ectopy. Respiratory: Airway is patent         

      Respiratory effort is even, unlabored, Respiratory pattern is regular, symmetrical,         

      Breath sounds are clear bilaterally. Derm: Skin is intact, is healthy with good turgor,     

      Skin is dry, Skin temperature is warm.                                                      

                                                                                                  

Vital Signs:                                                                                      

18:32 BP 95 / 59; Pulse 87; Resp 18 S; Temp 98.6(O); Pulse Ox 95% on R/A; Weight 92.99 kg     gr2 

      (R); Height 6 ft. 1 in. (185.42 cm) (R); Pain 7/10;                                         

18:42  / 56 (auto/);                                                                    kas2

18:45 Pulse 80 MON; Pulse Ox 95% ;                                                            kas2

18:45 Resp 18; Temp 98.2(O); Pain 0/10;                                                       kas2

18:57  / 58 (auto/);                                                                    kas2

18:57 Pulse 84 MON; Pulse Ox 93% ;                                                            kas2

19:12  / 60 (auto/);                                                                    kas2

19:12 Pulse 88 MON; Pulse Ox 96% ;                                                            kas2

19:27  / 61 (auto/);                                                                    kas2

19:27 Pulse 82 MON; Pulse Ox 96% ;                                                            kas2

19:42 BP 99 / 50 (auto/);                                                                     kas2

19:42 Pulse 84 MON; Pulse Ox 94% ;                                                            kas2

19:57  / 59 (auto/);                                                                    kas2

19:57 Pulse 80 MON; Pulse Ox 93% ;                                                            kas2

20:12 BP 98 / 54 RA Supine; Pulse 77; Resp 18; Temp 96.2; Pulse Ox 92% on R/A; Pain 0/10;     kas2

20:12 BP 83 / 53 RA Sitting; Pulse 86; Resp 18; Pulse Ox 94% on R/A;                          kas2

20:12 BP 87 / 54 RA Standing; Pulse 95; Resp 18; Pulse Ox 97% on R/A;                         kas2

20:12  / 60 (auto/);                                                                    kas2

20:12 Pulse 76 MON; Pulse Ox 93% ;                                                            kas2

20:27  / 59 (auto/);                                                                    kas2

20:27 Pulse 74 MON; Pulse Ox 99% ;                                                            kas2

20:42  / 59 (auto/);                                                                    kas2

20:42 Pulse 74 MON; Pulse Ox 92% ;                                                            kas2

20:57  / 58 (auto/);                                                                    kas2

20:57 Pulse 74 MON; Pulse Ox 96% ;                                                            kas2

21:12  / 61 (auto/);                                                                    kas2

21:12 Pulse 74 MON; Pulse Ox 95% ;                                                            kas2

21:27  / 62 (auto/);                                                                    kas2

21:27 Pulse 76 MON; Pulse Ox 96% ;                                                            kas2

21:42  / 59 (auto/);                                                                    kas2

21:42 Pulse 78 MON; Pulse Ox 98% ;                                                            kas2

21:57  / 66 (auto/);                                                                    kas2

21:57 Pulse 78 MON; Pulse Ox 98% ;                                                            kas2

22:12  / 59 (auto/);                                                                    kas2

22:12 Pulse 78 MON; Pulse Ox 96% ;                                                            kas2

22:15  / 59; Pulse 78; Resp 18; Temp 97.9; Pulse Ox 98% on R/A; Pain 0/10;              kas2

18:32 Body Mass Index 27.05 (92.99 kg, 185.42 cm)                                             gr2 

                                                                                                  

Vitals:                                                                                           

18:32 Log In Time: 2017 at 18:32. RN notified that patient meets Red Flag         gr2 

      criteria.                                                                                   

                                                                                                  

ED Course:                                                                                        

18:32 Patient visited by [address]                                                   gr2 

18:32 Marry Steele is Private Physician.                                                    gr2 

18:32 Patient moved to Waiting                                                                gr2 

18:34 Patient visited by [address]                                                   gr2 

18:34 Patient moved to Pre RCE                                                                gr2 

18:35 Patient visited by Oli Kelley, SHERI.                                               mlb1

18:37 Triage Initiated                                                                        mlb1

18:39 Patient visited by Oli Kelley, RN.                                               mlb1

18:40 Elena Ortega,RN is Primary Nurse.                                                        mlb1

18:40 Patient moved to 11                                                                     mlb1

18:59 Nasreen Crandall,SHERI is Primary Nurse.                                                          kas2

19:05 Patient visited by Boubacar Chavira PCA.                                                     jmv 

19:05 EKG done. (by ED staff). Reviewed by Bret METCALF.                                   jmv 

19:06 Patient visited by Boubacar Chavira PCA.                                                     jmv 

19:18 Guy Watson DO is Attending Physician.                                            mm11

19:18 Patient visited by Guy Watson DO.                                                mm11

19:31 Patient visited by Guy Watson DO.                                                mm11

19:38 Basic Metabolic Profile Sent.                                                           kas2

19:38 CBC with Diff Sent.                                                                     kas2

19:38 Cardiac Injury Profile Sent.                                                            kas2

19:38 Thyroid Stimulating Hormone Sent.                                                       kas2

19:39 Troponin Sent.                                                                          kas2

19:40 [facility] Payment Agreement was scanned into [employer] and attached to record.               zo  

19:41 Patient visited by Nasreen Crandall RN.                                                        kas2

19:41 Inserted saline lock: 20 gauge in right forearm and blood collected. The patient        kas2

      tolerated the procedure well. No procedures done that require assistance.                   

19:44 Patient name changed from Jose Antonio\S\\S\Chaple\S\ to Jose Antonio\S\ \S\Chaple.                        
   EDMS

20:16 Patient visited by Nasreen Crandall RN.                                                        kas2

20:37 Urinalysis Sent.                                                                        kas2

20:37 Urine Culture Sent.                                                                     kas2

20:42 CT Head Without Contrast Returned.                                                      EDMS

21:12 Patient visited by Nasreen Crandall RN.                                                        kas2

21:15 Patient visited by Nasreen Crandall RN.                                                        kas2

21:18 Mayur Mckeon MD is Hospitalizing Provider.                                             mm11

21:44 Patient visited by Naseren Crandall RN.                                                        kas2

22:19 Patient visited by Nasreen Crandall RN.                                                        kas2

22:29 The patient / caregiver is instructed regarding the plan of care and ED course.         kas2

22:31 Patient visited by Nasreen Crandall RN.                                                        [facility]

                                                                                             

08:59 T-Sheet-- Draft Copy was scanned into [employer] and attached to record.                   gb  

08:59 Trend VS was scanned into [employer] and attached to record.                               gb  

08:59 Radiology Report was scanned into [employer] and attached to record.                       gb  

                                                                                                  

Administered Medications:                                                                         

                                                                                             

20:45 Drug: NS 0.9% 1000 ml [sodium chloride 0.9 % intravenous solution] Route: IV; Rate:     kas2

      bolus; Site: right forearm;                                                                 

                                                                                                  

                                                                                                  

Attachments:                                                                                      

08:59 Trend VS                                                                                gb  

                                                                                                  

Order Results:                                                                                    

Lab Order: Fingerstick Blood Sugar; SPEC'M 17 18:52                                         

      Test: BEDSIDE GLUCOSE; Value: 145; Range: ; Abnormal: Above high normal; Units:       

      MG/DL; Status: F                                                                            

Lab Order: Basic Metabolic Profile; SPEC'M 17 19:20                                         

      Test: GLUCOSE, FASTING; Value: 147; Range: ; Abnormal: Above high normal; Units:      

      MG/DL; Status: F                                                                            

      Test: BLOOD UREA NITROGEN; Value: 38; Range: 7-18; Abnormal: Above high normal; Units:      

      MG/DL; Status: F                                                                            

      Test: CREATININE FOR GFR; Value: 1.70; Range: 0.70-1.30; Abnormal: Above high normal;       

      Units: MG/DL; Status: F                                                                     

      Test: GLOMERULAR FILTRATION RATE; Value: 51.8; Range: >49; Status: F                        

      Test: SODIUM LEVEL; Value: 142; Range: 136-145; Units: MEQ/L; Status: F                     

      Test: POTASSIUM SERUM; Value: 3.6; Range: 3.5-5.1; Units: MEQ/L; Status: F                  

      Test: CHLORIDE LEVEL; Value: 104; Range: ; Units: MEQ/L; Status: F                    

      Test: CARBON DIOXIDE LEVEL; Value: 29; Range: 21-32; Units: MEQ/L; Status: F                

      Test: ANION GAP; Value: 9; Range: 8-16; Units: MEQ/L; Status: F                             

      Test: CALCIUM LEVEL; Value: 7.9; Range: 8.8-10.2; Abnormal: Below low normal; Units:        

      MG/DL; Status: F                                                                            

      Test Note: &nbsp;; Units are mL/min/1.73 m2 Chronic Kidney Disease Staging per NKF:       

      Stage I & II GFR >=60 Normal to Mildly Decreased Stage III GFR 30-59 Moderately           

      Decreased Stage IV GFR 15-29 Severely Decreased Stage V GFR <15 Very Little GFR Left        

      ESRD GFR <15 on RRT                                                                         

      Test: AST/SGOT; Range: 15-37; Units: U/L; Status: I                                         

      Test: ALT/SGPT; Range: 12-78; Units: U/L; Status: I                                         

      Test: ALKALINE PHOSPHATASE; Range: ; Units: U/L; Status: I                            

      Test: BILIRUBIN,TOTAL; Range: 0.2-1.0; Units: MG/DL; Status: I                              

      Test: BILIRUBIN,DIRECT; Range: 0.0-0.2; Units: MG/DL; Status: I                             

      Test: TOTAL PROTEIN; Range: 6.4-8.2; Units: GM/DL; Status: I                                

      Test: ALBUMIN; Range: 3.2-5.2; Units: GM/DL; Status: I                                      

      Test: ALBUMIN/GLOBULIN RATIO; Range: 1.00-1.93; Status: I                                   

Lab Order: CBC with Diff; SPEC'M 17 19:20                                                   

      Test: WHITE BLOOD COUNT; Value: 3.0; Range: 4.0-10.0; Abnormal: Below low normal;           

      Units: K/mm3; Status: F                                                                     

      Test: RED BLOOD COUNT; Value: 3.74; Range: 4.30-6.10; Abnormal: Below low normal;           

      Units: M/mm3; Status: F                                                                     

      Test: HEMOGLOBIN; Value: 12.0; Range: 14.0-18.0; Abnormal: Below low normal; Units:         

      g/dl; Status: F                                                                             

      Test: HEMATOCRIT; Value: 35.8; Range: 42.0-52.0; Abnormal: Below low normal; Units: %;      

      Status: F                                                                                   

      Test: MEAN CORPUSCULAR VOLUME; Value: 95.7; Range: 80.0-96.0; Units: fl; Status: F          

      Test: MEAN CORPUSCULAR HEMOGLOBIN; Value: 32.1; Range: 27.0-33.0; Units: pg; Status: F      

      Test: MEAN CORPUSCULAR HGB CONC; Value: 33.5; Range: 32.0-36.5; Units: g/dl; Status: F      

      Test: RED CELL DISTRIBUTION WIDTH; Value: 12.3; Range: 11.5-14.5; Units: %; Status: F       

      Test: PLATELET COUNT, AUTOMATED; Value: 126; Range: 150-450; Abnormal: Below low            

      normal; Units: k/mm3; Status: F                                                             

      Test: NEUTROPHILS %; Value: 45.1; Range: 36.0-66.0; Units: %; Status: F                     

      Test: LYMPH %; Value: 33.0; Range: 24.0-44.0; Units: %; Status: F                           

      Test: MONO %; Value: 16.3; Range: 0.0-5.0; Abnormal: Above high normal; Units: %;           

      Status: F                                                                                   

      Test: EOS %; Value: 1.1; Range: 0.0-3.0; Units: %; Status: F                                

      Test: BASO %; Value: 0.6; Range: 0.0-1.0; Units: %; Status: F                               

      Test: LARGE UNSTAINED CELL %; Value: 3.8; Range: 0.0-4.0; Units: %; Status: F               

      Test: NEUTROPHILS #; Value: 1.4; Range: 1.8-7.7; Abnormal: Below low normal; Units:         

      K/mm3; Status: F                                                                            

      Test: LYMPH #; Value: 1.1; Range: 1.5-4.5; Abnormal: Below low normal; Units: K/mm3;        

      Status: F                                                                                   

      Test: MONO #; Value: 0.5; Range: 0.0-0.8; Units: K/mm3; Status: F                           

      Test: EOS #; Value: 0.0; Range: 0.0-0.50; Units: K/mm3; Status: F                           

      Test: BASO #; Value: 0.0; Range: 0.0-0.2; Units: K/mm3; Status: F                           

      Test: LARGE UNSTAINED CELL #; Value: 0.1; Range: 0.0-0.4; Units: K/mm3; Status: F           

Lab Order: Cardiac Injury Profile; SPEC' 17 19:20                                          

      Test: CPK CREATINE PHOSPHOKINASE; Value: 462; Range: ; Abnormal: Above high           

      normal; Units: U/L; Status: F                                                               

      Test: CK-MB VALUE MASS; Value: 2.5; Range: 0.0-3.6; Units: NG/ML; Status: F                 

      Test: MB/CK RELATIVE INDEX; Value: 0.54; Range: < OR =4; Status: F                          

      Test Note: &nbsp;; DIAGNOSIS CRITERIA MMB ng/ml Relative Index (RI) NON-AMI < or = 5      

      N/A GRAY ZONE > 5 < or = 4 AMI > 5 > 4                                                      

Lab Order: Thyroid Stimulating Hormone; SPEC' 17 19:20                                     

      Test: THYROID STIMULATING HORMONE; Value: 0.770; Range: 0.358-3.740; Units: uIU/ML;         

      Status: F                                                                                   

Lab Order: Troponin; SPEC' 17 19:20                                                        

      Test: TROPONIN I; Value: < 0.02; Range: < 0.10; Units: NG/ML; Status: F                     

      Test Note: &nbsp;; Troponin I Reference Interval for [employer] [place] LOCI: 99th             

      Percentile= 0.00-0.045 ng/ml Risk Stratification: <= 0.10 ng/ml Decreased Risk for          

      Adverse Clinical Events. 0.10-1.50 ng/ml Increased Risk for Adverse Clinical Events.        

      Evaluation of additional criterion and/or repeat testing in 2-6 hours is suggested to       

      rule out myocardial damage. >= 1.50 ng/ml Indicative of Myocardial Injury.                  

Lab Order: Urinalysis; SPEC'M 17 20:36                                                      

      Test: APPEARANCE, URINE; Value: CLEAR; Range: CLEAR; Status: F                              

      Test: COLOR, URINE; Value: YELLOW; Range: YELLOW; Status: F                                 

      Test: PH,URINE; Value: 5.0; Range: 5.0-9.0; Units: UNITS; Status: F                         

      Test: SPECIFIC GRAVITY URINE AUTO; Value: 1.012; Range: 1.002-1.035; Status: F              

      Test: PROTEIN, URINE AUTO; Value: NEGATIVE; Range: NEGATIVE; Units: mg/dL; Status: F        

      Test: GLUCOSE, URINE (UA) AUTO; Value: 3+; Range: NEGATIVE; Abnormal: Above high            

      normal; Units: mg/dL; Status: F                                                             

      Test: KETONE, URINE AUTO; Value: NEGATIVE; Range: NEGATIVE; Units: mg/dL; Status: F         

      Test: UROBILINOGEN, URINE AUTO; Value: 0.2; Range: 0.0-2.0; Units: mg/dL; Status: F         

      Test: BILIRUBIN, URINE AUTO; Value: NEGATIVE; Range: NEGATIVE; Status: F                    

      Test: NITRITE, URINE AUTO; Value: NEGATIVE; Range: NEGATIVE; Status: F                      

      Test: LEUKOCYTE ESTERASE, URINE AUTO; Value: NEGATIVE; Range: NEGATIVE; Status: F           

      Test: BLOOD, URINE BLOOD; Value: 1+; Range: NEGATIVE; Abnormal: Above high normal;          

      Status: F                                                                                   

      Test: WBC, URINE AUTO; Value: 1; Range: 0-3; Units: /HPF; Status: F                         

      Test: RBC, URINE AUTO; Value: 3; Range: 0-3; Units: /HPF; Status: F                         

      Test: BACTERIA, URINE AUTO; Value: NEGATIVE; Range: NEGATIVE; Status: F                     

      Test: SQUAMOUS EPITHELIAL CELL UR AU; Value: 0; Range: 0-6; Units: /HPF; Status: F          

      Test: MUCUS, URINE; Value: SMALL; Range: NEGATIVE; Status: F                                

      Test: HYALINE CAST, URINE AUTO; Value: 0; Range: 0-1; Units: /LPF; Status: F                

Lab Order: Hemoglobin A1c; SPEC'M 17 19:20                                                  

      Test: HEMOGLOBIN A1c; Value: 7.5; Range: 4.5-6.2; Abnormal: Above high normal; Units:       

      %; Status: F                                                                                

      Test: ESTIMATED AVERAGE GLUCOSE; Value: 169; Range: ; Abnormal: Above high            

      normal; Units: MG/DL; Status: F                                                             

Lab Order: LIVER PROFILE; SPEC'M 17 19:20                                                   

      Test: AST/SGOT; Value: 47; Range: 15-37; Abnormal: Above high normal; Units: U/L;           

      Status: F                                                                                   

      Test: ALT/SGPT; Value: 38; Range: 12-78; Units: U/L; Status: F                              

      Test: ALKALINE PHOSPHATASE; Value: 66; Range: ; Units: U/L; Status: F                 

      Test: BILIRUBIN,TOTAL; Value: 0.8; Range: 0.2-1.0; Units: MG/DL; Status: F                  

      Test: BILIRUBIN,DIRECT; Value: 0.2; Range: 0.0-0.2; Units: MG/DL; Status: F                 

      Test: TOTAL PROTEIN; Value: 6.6; Range: 6.4-8.2; Units: GM/DL; Status: F                    

      Test: ALBUMIN; Value: 3.3; Range: 3.2-5.2; Units: GM/DL; Status: F                          

      Test: ALBUMIN/GLOBULIN RATIO; Value: 1.00; Range: 1.00-1.93; Status: F                      

Lab Order: MAGNESIUM LEVEL; SPEC'M 17 19:20                                                 

      Test: MAGNESIUM LEVEL; Value: 2.4; Range: 1.8-2.4; Units: MG/DL; Status: F                  

Lab Order: PATHOLOGIST REVIEW COMPREHENSI; SPEC'M 17 19:20                                  

      Test: SLIDE REVIEW; Value: Report; Status: F                                                

      Test: SOURCE; Value: PERIPHERAL SMEAR; Status: F                                            

      Test: REASON FOR REVIEW; Value: PANCYTOPENIA; Status: F                                     

      Test Note: &nbsp;; Slide and/or specimen referred to Pathologist for review. Results of   

      the review are located in the EMR Pathology module under Peripheral Smear when              

      completed.                                                                                  

                                                                                                  

Radiology Order: CT Head Without Contrast                                                         

      Test: CT Head Without Contrast                                                              

      REASON FOR EXAMINATION: Syncope; ; CLINICAL HISTORY: Syncope.; TECHNIQUE: Multiple          

      axial CT images were obtained through the brain without IV contrast material.;              

      COMMENTS:; There is normal configuration of sella turcica. There are no intra or            

      extra-axial collections. There; is no mass effect or midline shift. There is no             

      evidence of hematoma formation. No hydrocephalus is p; resent. The ventricles are           

      symmetrical. Bilateral basal ganglia calcifications are present.; There is diffuse          

      age-appropriate cerebellar and cerebral atrophy with proportionally dilated ventricl;       

      es and cortical sulci.; There are bilateral periventricular and subcortical white           

      matter hypolucencies compatible with mild c; hronic microvascular disease.; Otherwise,      

      no significant focal abnormalities are seen either in the posterior fossa or                

      supratentoria; l compartment.; IMPRESSION:; 1. Age-appropriate cerebellar and cerebral      

      atrophy.; 2. Mild chronic microvascular disease.; 3. No evidence of acute intracranial      

      pathology.; Thank you for your kind referral of this patient.; ; Electronically signed      

      by GORGE DURAN MD on 2017 07:55:20 PM ET;                                             

Outcome:                                                                                          

                                                                                             

21:18 Decision to Hospitalize by Provider.                                                    [id number]

22:29 Discharge Assessment: patient administered narcotics - no. The following High Risk      [facility]

      Discharge criteria are identified: None. Admitted to PCU accompanied by nurse,              

      accompanied by tech, via stretcher, on monitor, with chart. Condition: good Condition:      

      stable Condition: improved. CT Study completed. Property :Personal belongings accompany     

      Pt.                                                                                         

22:42 Patient left the ED.                                                                    [id number]

                                                                                                  

Signatures:                                                                                       

Dispatcher [employer]                           EDMS                                                 

Selena Johnson, Oli Horner, RN                   RN   [id number]                                                 

Shailesh Ramirez Matthew, DO                    DO   [id number]                                                 

Mariella Kumar RN                     RN   sls1                                                 

[address]                            gr2                                                  

Nasreen CrandallRN                            RN   kas2                                                 

Boubacar Chavira, HERNAN                         PCA  jm                                                  

                                                                                                  

Corrections: (The following items were deleted from the chart)                                    

19:06 19:05 EKG done. (by ED staff). [email or web address]                                                      [email or web address] 

                                                                                                  

**************************************************************************************************



*** Chart Complete ***
MTDD

## 2017-01-21 NOTE — DSES
DATE OF ADMISSION:  01/18/2017

DATE OF DISCHARGE:  01/20/2017

 

PRIMARY CARE PROVIDER:  Marry Steele MD

 

FINAL DIAGNOSES:  Influenza A, syncope and collapse, orthostatic positive vital

signs, neutropenic, diabetes type 2, avascular necrosis of right hip right

femoral head, and bone infarct.

 

HISTORY OF PRESENT ILLNESS:  This is a 71-year-old male patient with underlying

medical history of anemia, diabetes, hypertension, history of pyelonephritis,

history of elevated prostate-specific antigen (PSA), had cold-like symptoms since

Sunday, went to work, was unable to  work on Monday and Tuesday because he felt

weak and had subjective fever, cough, which he was treated with over-the-counter

remedies.  Awoke on Wednesday morning, which he felt quite well.  His car had

been in repair shop, and he felt well enough to go get it.  He has been eating

and drinking normally, thinking that he can go to work.  The patient works at

night.  Went to bed around 4 p.m. and usually gets up at 8:30 for his 9 p.m.

shift.  The patient went to the refrigerator to get a bottle of water, felt a

little bit lightheaded.  The next thing he knew, he woke up on the floor with the

bottle of water right next to him.  He does not know how long he was down.  He

did not remember falling.  Afterwards, his memory seemed a little bit sketchy,

but he got onto the couch.  His wife returned at 6:15, and he was on the couch,

which was unusual.  No urinary or bowel incontinence.  No tongue-biting.

Reported left leg pain.  Denies history of seizure.  Denies chest pain, pressure,

discomfort.  Denies any fevers or chills.

 

HOSPITAL COURSE:  The patient was admitted to the hospital, placed on telemetry

for monitoring.  Cardiac enzymes were sent.  Echocardiogram was done.

Intravenous (IV) hydration was provided.  The patient initially had elevated

creatinine with acute kidney injury.  Kidney function progressively improved.

Blood pressure medication was held due to the patient orthostatic positive.

Orthostatic vital signs were followed.  Influenza was initially negative, but a

respiratory panel came back today positive for influenza A.  Blood culture has

been negative.  MRI and MRA was done.  X-ray of the knee and hip was done,

showing avascular necrosis of the right femoral head and also old infarct of the

right knee area.  The case was discussed with Dr. Refugio Olsen, orthopedic

group.  As per Dr. Refugio Olsen, based on the study, the changes seems to be

chronic.  Does not need any acute intervention at this point.  Outpatient

followup.  Influenza returned positive.  Subsequently, Tamiflu was sent to the

pharmacy.  The patient's blood pressure medication was adjusted.  The patient

continued to feel comfortable, ready to be discharged for further care as

outpatient.  The patient reported history of sickle cell trait, possible bone

infarct and avascular necrosis due to sickle cell trait.

 

VITAL SIGNS:  Temperature 96.5, pulse 81, respiration 18, blood pressure 132/68,

pulse oximetry 97% on room air.

 

LABORATORY:

WBC 2.1, hemoglobin and hematocrit 11.5/34.2, platelets 111.

 

Chemistry:  Sodium 145, potassium 3.7, chloride 109, bicarbonate 28, BUN 23,

creatinine 1.16.

 

DISCHARGE MEDICATIONS:

- losartan 25 mg by mouth daily

- Tamiflu 75 mg by mouth twice a day, ten capsules prescribed

- vitamin C 500 mg by mouth daily

- cyanocobalamin 1000 mcg by mouth intramuscular (IM) once a month

- diltiazem 120 mg by mouth daily

- Jardiance 25 mg by mouth daily

- omeprazole 20 mg by mouth daily

- pyridoxine 50 mg by mouth daily

- Zocor 20 mg by mouth nightly

- vitamin D 50,000 units by mouth every 2 weeks

 

The patient's losartan and hydrochlorothiazide combination pill was

discontinued.

 

DISCHARGE INSTRUCTIONS:  The patient instructed to followup with primary care

provider in 7 days.  Followup with orthopedic surgeon in 1-2 weeks.  Return to

the hospital if symptoms worsen.

## 2017-08-11 ENCOUNTER — HOSPITAL ENCOUNTER (OUTPATIENT)
Dept: HOSPITAL 53 - M SMT | Age: 72
End: 2017-08-11
Attending: NURSE PRACTITIONER
Payer: COMMERCIAL

## 2017-08-11 DIAGNOSIS — N47.1: ICD-10-CM

## 2017-08-11 DIAGNOSIS — Z01.812: Primary | ICD-10-CM

## 2017-09-11 ENCOUNTER — HOSPITAL ENCOUNTER (OUTPATIENT)
Dept: HOSPITAL 53 - M EKG | Age: 72
End: 2017-09-11
Attending: NURSE PRACTITIONER
Payer: COMMERCIAL

## 2017-09-11 DIAGNOSIS — N47.1: ICD-10-CM

## 2017-09-11 DIAGNOSIS — Z01.812: Primary | ICD-10-CM

## 2017-09-11 NOTE — ECGEPIP
Stationary ECG Study

                              [facility]

                                       

                                       Test Date:    2017

Pat Name:     SAULO CHARLES            Department:   

Patient ID:   [id number]                 Room:         -

Gender:       M                        Technician:   KATE

:          1945               Requested By: Moraima OLIVIA

Order Number: [id number]     Reading MD:   Mayur Mckeon

                                 Measurements

Intervals                              Axis          

Rate:         87                       P:            66

IA:           156                      QRS:          32

QRSD:         79                       T:            43

QT:           353                                    

QTc:          427                                    

                           Interpretive Statements

SINUS RHYTHM

Low QRS complex voltage in the limb leads

similar to tracing done 17

 

Electronically Signed On 2017 13:14:51 EDT by Mayur Mckeon

## 2017-09-20 ENCOUNTER — HOSPITAL ENCOUNTER (OUTPATIENT)
Dept: HOSPITAL 53 - M SDC | Age: 72
Discharge: HOME | End: 2017-09-20
Attending: UROLOGY
Payer: COMMERCIAL

## 2017-09-20 VITALS — DIASTOLIC BLOOD PRESSURE: 76 MMHG | SYSTOLIC BLOOD PRESSURE: 144 MMHG

## 2017-09-20 VITALS — HEIGHT: 73 IN | WEIGHT: 207 LBS | BODY MASS INDEX: 27.43 KG/M2

## 2017-09-20 DIAGNOSIS — I65.29: ICD-10-CM

## 2017-09-20 DIAGNOSIS — M54.2: ICD-10-CM

## 2017-09-20 DIAGNOSIS — E04.1: ICD-10-CM

## 2017-09-20 DIAGNOSIS — Z87.891: ICD-10-CM

## 2017-09-20 DIAGNOSIS — N40.0: ICD-10-CM

## 2017-09-20 DIAGNOSIS — E11.9: ICD-10-CM

## 2017-09-20 DIAGNOSIS — B35.1: ICD-10-CM

## 2017-09-20 DIAGNOSIS — E55.9: ICD-10-CM

## 2017-09-20 DIAGNOSIS — I49.9: ICD-10-CM

## 2017-09-20 DIAGNOSIS — I10: ICD-10-CM

## 2017-09-20 DIAGNOSIS — H40.003: ICD-10-CM

## 2017-09-20 DIAGNOSIS — K21.0: ICD-10-CM

## 2017-09-20 DIAGNOSIS — F52.21: ICD-10-CM

## 2017-09-20 DIAGNOSIS — N47.1: Primary | ICD-10-CM

## 2017-09-20 DIAGNOSIS — N13.2: ICD-10-CM

## 2017-09-20 DIAGNOSIS — E53.8: ICD-10-CM

## 2017-09-20 DIAGNOSIS — Z79.899: ICD-10-CM

## 2017-09-20 DIAGNOSIS — E78.5: ICD-10-CM

## 2017-09-20 DIAGNOSIS — Q27.33: ICD-10-CM

## 2017-09-20 DIAGNOSIS — M12.9: ICD-10-CM

## 2017-09-20 DIAGNOSIS — D64.9: ICD-10-CM

## 2017-09-20 PROCEDURE — 88304 TISSUE EXAM BY PATHOLOGIST: CPT

## 2017-09-20 PROCEDURE — 54161 CIRCUM 28 DAYS OR OLDER: CPT

## 2017-09-21 NOTE — RO
DATE OF PROCEDURE:  09/20/2017

 

PREPROCEDURE DIAGNOSIS:  Phimosis.

 

POSTPROCEDURE DIAGNOSIS:  Phimosis.

 

FINDINGS:  Phimosis.

 

PROCEDURE:  Circumcision.

 

SURGEON:  Dr. Hermilo Clemente.

 

ASSISTANT:  None.

 

ANESTHESIA:  General.

 

COMPLICATIONS: None.

 

ESTIMATED BLOOD LOSS:  N/A.

 

HISTORY OF PRESENT ILLNESS:  72-year-old male patient with a history of phimosis

and recurrent balanitis.  For this reason, he has consented for a circumcision.

 

 

PROCEDURE DESCRIPTION:  With the patient under general anesthesia in supine

position, after prepping and draping the area of concern which included the

entire genitalia, we started by doing a penile block with Marcaine 0.25% and

lidocaine 1%, a total of 10 mL in the base of the penis.  We then proceeded to 
do

a dorsal slit with Metzenbaum scissors and retracted for foreskin.  We again

prepped with Betadine and then placed a silk stitch #3-0 on top of the glans to

actually serve as retraction.  At that moment in time, we did an incision with a

cold knife #15 blade 1 cm away from the sulcus of the glans

circumferential incision.  4 cm closer toward the base of the penis, we made

another circumferential incision.  With Metzenbaum scissors, we excised the skin

between both incisions.  We then fulgurated with Bovie cautery all the bleeding

vessels.  At that moment in time, we placed stitches to approximate both borders

of the skin with #3-0 Chromic in separate stitches.  Once it was completed

reapproximated, we placed bacitracin cream, Kerlix row and Coban.  We then took

the silk stitch out and put pressure on the glans.

 

PLAN:  The patient will go home with antibiotic and Tylenol for pain.  He will 
be

having the Coban and the bandages removed in [facility]

[place].  He cannot have sexual intercourse for 1 month.

FRANCISCO

## 2017-09-28 ENCOUNTER — HOSPITAL ENCOUNTER (OUTPATIENT)
Dept: HOSPITAL 53 - M RAD | Age: 72
End: 2017-09-28
Attending: FAMILY MEDICINE
Payer: COMMERCIAL

## 2017-09-28 DIAGNOSIS — J98.4: Primary | ICD-10-CM

## 2017-09-28 DIAGNOSIS — R91.8: ICD-10-CM

## 2017-09-28 PROCEDURE — 71260 CT THORAX DX C+: CPT

## 2017-09-29 NOTE — REP
Clinical:  Abnormal chest x-ray findings.

 

Technique:  Axial contrast enhanced images from the thoracic inlet to the upper

abdomen using 100 ml Isovue 370 intravenous contrast material with coronal and

sagittal re-formations.

 

Comparison:  05/03/2016.

 

Findings:  Lung fields demonstrate chronic-appearing interstitial changes and

mild posterior basilar dependent changes.  A few scattered noncalcified pulmonary

nodules measuring between 2 and 5 mm are again identified and similar to prior

examination. A 12 mm nodule in the deep right lateral sulcus (image 72) remains

stable compared to 07/22/2015.  No obvious, new, significant nodule or mass

lesion is appreciated.  No focal consolidation, pleural effusion or pneumothorax.

Tracheobronchial tree is patent.  Mediastinum demonstrates atherosclerotic

changes to the thoracic aorta and coronary arteries without aortic

aneurysm/dissection, cardiomegaly or pericardial effusion.  Limited evaluation of

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

lobe.

 

Impression:

Lung fields demonstrate diffuse chronic age-related interstitial changes and mild

dependent changes.  Few scattered noncalcified nodules are again identified and

similar to prior examination.  No new acute mediastinal or pleuroparenchymal

process identified.

 

 

Signed by

Parish Colon MD 09/29/2017 05:46 A

## 2017-10-17 ENCOUNTER — HOSPITAL ENCOUNTER (OUTPATIENT)
Dept: HOSPITAL 53 - M SMT | Age: 72
End: 2017-10-17
Attending: UROLOGY
Payer: COMMERCIAL

## 2017-10-17 DIAGNOSIS — N42.32: Primary | ICD-10-CM

## 2018-10-10 ENCOUNTER — HOSPITAL ENCOUNTER (OUTPATIENT)
Dept: HOSPITAL 53 - M SMT | Age: 73
End: 2018-10-10
Attending: UROLOGY
Payer: COMMERCIAL

## 2018-10-10 DIAGNOSIS — R39.9: Primary | ICD-10-CM

## 2018-10-10 PROCEDURE — 84154 ASSAY OF PSA FREE: CPT

## 2018-10-13 LAB
PSA FREE MFR SERPL: 28.9 %
PSA FREE SERPL-MCNC: 2.34 NG/ML
PSA SERPL-MCNC: 8.1 NG/ML (ref 0–4)

## 2018-10-16 ENCOUNTER — HOSPITAL ENCOUNTER (OUTPATIENT)
Dept: HOSPITAL 53 - M RAD | Age: 73
End: 2018-10-16
Attending: UROLOGY
Payer: COMMERCIAL

## 2018-10-16 DIAGNOSIS — N13.8: ICD-10-CM

## 2018-10-16 DIAGNOSIS — N40.1: Primary | ICD-10-CM

## 2018-10-16 DIAGNOSIS — R39.0: ICD-10-CM

## 2018-10-16 PROCEDURE — 76857 US EXAM PELVIC LIMITED: CPT

## 2020-12-15 ENCOUNTER — HOSPITAL ENCOUNTER (OUTPATIENT)
Dept: HOSPITAL 53 - M RAD | Age: 75
End: 2020-12-15
Attending: FAMILY MEDICINE
Payer: MEDICARE
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
## 2020-12-15 DIAGNOSIS — E04.1: ICD-10-CM
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
## 2020-12-15 DIAGNOSIS — S83.204D: Primary | ICD-10-CM

## 2020-12-15 NOTE — REP
INDICATION:

LT KNEE PAIN ? MENISCUS TEAR / THYROID NODULE  MR 1ST US 2ND.



COMPARISON:

Comparison radiographs January 18, 2017.  Comparison left knee MRI study January 19, 2017..



TECHNIQUE:

Axial, coronal, and sagittal imaging planes utilized.  T1, proton density, and T2

weighted scans are included with without fat saturation in the usual fashion.



FINDINGS:

There are stable well circumscribed foci of abnormal signal intensity in the marrow

space of the distal femur and proximal tibia consistent with old areas of bone

infarct.  These are unchanged from the 2017 prior study.  There is no evidence of

adjacent marrow edema other evidence to suggest an aggressive lesion.  Cortical and

medullary bone signal intensity are otherwise normal.



There is a small amount of suprapatellar bursal joint fluid.  No Baker's cyst is

visible.  Patellar and quadriceps tendons are intact.  Old disruption of the anterior

cruciate ligament is again seen.  The posterior cruciate ligament appears intact.  Is

somewhat thickened.  There is no evidence of medial or lateral collateral ligament

disruption.  The medial meniscus is quite diminutive with heterogeneous signal

intensity in the remaining meniscal material similar to the prior study consistent

with an old medial meniscal tear.  No lateral meniscal tear is appreciated.  There is

moderate medial compartment chondromalacia with articular cartilage irregularity and

heterogeneous signal intensity but no full-thickness articular cartilage lesion is

seen.  Mild chondromalacia in the patellofemoral compartment.  The medial compartment

chondromalacia appears more pronounced than on the 2017 prior study.



IMPRESSION:

Old medial meniscal tear and anterior cruciate ligament tear is again seen.

Progression in chondromalacia in the medial compartment consistent with mild

osteoarthritis.  Small amount of joint fluid.  Evidence of old bone infarct pattern in

the distal femur and proximal tibia unchanged.





<Electronically signed by Stanislaw Giraldo > 12/15/20 1000

## 2020-12-15 NOTE — REP
INDICATION:

/Nontoxic THYROID NODULE  .



COMPARISON:

Thyroid ultrasound dated 10/22/2019.



TECHNIQUE:

Thyroid ultrasound.



FINDINGS:

On the comparison study there were 2 solid nodules in the thyroid left lobe, 1

measuring up to 9 mm in the other measuring up to 4 mm.

On the study today the thyroid gland is upper normal size.

The thyroid right lobe measures 4.8 x 1.7 x 1.4 cm.

The thyroid left lobe measures 4.9 x 1.6 x 1.2 cm.

The isthmus measures 2.8 mm thickness.

The thyroid parenchyma in the right and left lobes is mildly heterogeneous.

There is a nodule at the junction of the isthmus and left lobe measuring 1.5 x 0.7 cm.

This appears to be a solid nodule.

The center of this nodule appears slightly  echo lucent.

Given the nodule size,  ultrasound-guided needle aspiration biopsy is recommended.

No other thyroid nodules or masses are identified.



IMPRESSION:

There is a 1.5 x 0.7 cm solid nodule at the junction of the isthmus and left lobe.

The center of this nodule is slightly echo lucent.  Given the size of this nodule

ultrasound-guided needle aspiration biopsy is recommended.





<Electronically signed by Brandin Pandya > 12/15/20 0839

## 2021-01-20 ENCOUNTER — HOSPITAL ENCOUNTER (OUTPATIENT)
Dept: HOSPITAL 53 - M RAD | Age: 76
End: 2021-01-20
Attending: UROLOGY
Payer: COMMERCIAL

## 2021-01-20 DIAGNOSIS — R97.20: Primary | ICD-10-CM

## 2021-01-20 DIAGNOSIS — M87.051: ICD-10-CM

## 2021-01-20 DIAGNOSIS — N40.0: ICD-10-CM

## 2021-01-20 PROCEDURE — 72197 MRI PELVIS W/O & W/DYE: CPT

## 2021-01-20 NOTE — REP
INDICATION:

ELEVATED PROSTATE SPECIFIC ANTIGEN.



COMPARISON:

01/09/2017.



TECHNIQUE:

Using a phased array surface coil, small field-of-view imaging was acquired using T2

weighted scans in the axial, coronal, and sagittal imaging planes.  Small

field-of-view diffusion-weighted sequences are acquired.  Small field-of-view axial T1

weighted scans are acquired dynamically before and after the intravenous

administration of 17 mL of ProHance.  Imaging is reviewed using the [employer]

computer-aided detection system.



FINDINGS:

Once again there is avascular necrosis of the right femoral head noted.  No other bone

lesion is seen.  I see no adenopathy in the pelvis.  Penile implant is noted.



Prostate is enlarged measuring 5.7 by 4.5 x 5.8 cm, total volume is 72.8 cc.  Diffuse

nodular mixed signal is seen throughout the prostate compatible with benign prostatic

hypertrophy.  Seminal vesicles appear unremarkable.



Two regions of interest are identified for MR ultrasound fusion biopsy.  In the left

mid transitional zone and apical transitional zone there is an area of mixed low

signal on T2 weighted images and diffusion-weighted images with areas of type 3

enhancement.  The area measures 3.0 x 1.5 x 3.1 cm for total volume of 8.81 cc.

Overall level of suspicion is 3/5 with clinically significant cancer equivocal.



In the right mid posterior transitional zone a similar appearing area of mixed low

signal on T2 and diffusion-weighted images demonstrates areas of type 3 enhancement.

The area measures 1.8 x 1.0 x 2.8 cm.  Total volume of 3.05 cc.  Overall level of

suspicion is 3/5 with clinically significant cancer equivocal.



IMPRESSION:

Enlarged prostate.  Two regions of interest are identified for potential MR ultrasound

fusion guided biopsy.





<Electronically signed by Brandin Rocha > 01/20/21 [id number]

## 2021-01-21 ENCOUNTER — HOSPITAL ENCOUNTER (OUTPATIENT)
Dept: HOSPITAL 53 - M LAB REF | Age: 76
End: 2021-01-21
Attending: INTERNAL MEDICINE
Payer: COMMERCIAL

## 2021-01-21 DIAGNOSIS — E04.1: Primary | ICD-10-CM

## 2021-02-02 ENCOUNTER — HOSPITAL ENCOUNTER (OUTPATIENT)
Dept: HOSPITAL 53 - M SMT PRO | Age: 76
End: 2021-02-02
Attending: UROLOGY
Payer: COMMERCIAL

## 2021-02-02 DIAGNOSIS — R97.20: Primary | ICD-10-CM

## 2021-02-02 PROCEDURE — 76942 ECHO GUIDE FOR BIOPSY: CPT

## 2021-02-02 NOTE — REPPI
INDICATION:

ELEVATED PSA.



COMPARISON:

Recent MRI findings 20 January 2021..



TECHNIQUE:

Transrectal prostate sonography.



FINDINGS:

Transrectal sonographic guidance is provided to Dr. Acevedo who performed trans

rectal ultrasound guided needle biopsy procedure.



IMPRESSION:

Transrectal prostate sonographic guidance as above.





<Electronically signed by Stanislaw Giraldo > 02/02/21 [id number]

## 2022-06-20 ENCOUNTER — HOSPITAL ENCOUNTER (EMERGENCY)
Dept: HOSPITAL 53 - M ED | Age: 77
Discharge: HOME | End: 2022-06-20
Payer: COMMERCIAL

## 2022-06-20 VITALS — WEIGHT: 200.42 LBS | BODY MASS INDEX: 26.56 KG/M2 | HEIGHT: 73 IN

## 2022-06-20 VITALS — DIASTOLIC BLOOD PRESSURE: 82 MMHG | SYSTOLIC BLOOD PRESSURE: 141 MMHG

## 2022-06-20 DIAGNOSIS — E11.9: ICD-10-CM

## 2022-06-20 DIAGNOSIS — E78.5: ICD-10-CM

## 2022-06-20 DIAGNOSIS — M25.512: Primary | ICD-10-CM

## 2022-06-20 DIAGNOSIS — Z79.84: ICD-10-CM

## 2022-06-20 DIAGNOSIS — I10: ICD-10-CM

## 2022-06-20 DIAGNOSIS — Z88.6: ICD-10-CM

## 2022-07-07 ENCOUNTER — HOSPITAL ENCOUNTER (EMERGENCY)
Dept: HOSPITAL 53 - M ED | Age: 77
Discharge: LEFT BEFORE DISCHARGE | End: 2022-07-07
Payer: COMMERCIAL

## 2022-07-07 VITALS — WEIGHT: 196.43 LBS | HEIGHT: 73 IN | BODY MASS INDEX: 26.03 KG/M2

## 2022-07-07 VITALS — DIASTOLIC BLOOD PRESSURE: 66 MMHG | SYSTOLIC BLOOD PRESSURE: 150 MMHG

## 2022-07-07 DIAGNOSIS — Z53.21: Primary | ICD-10-CM

## 2022-09-20 ENCOUNTER — HOSPITAL ENCOUNTER (OUTPATIENT)
Dept: HOSPITAL 53 - M RAD | Age: 77
End: 2022-09-20
Attending: FAMILY MEDICINE
Payer: COMMERCIAL

## 2022-09-20 DIAGNOSIS — E04.1: Primary | ICD-10-CM

## 2023-04-19 ENCOUNTER — HOSPITAL ENCOUNTER (OUTPATIENT)
Dept: HOSPITAL 53 - M PLAIMG | Age: 78
End: 2023-04-19
Attending: UROLOGY
Payer: OTHER GOVERNMENT

## 2023-04-19 DIAGNOSIS — R97.20: Primary | ICD-10-CM

## 2023-04-19 PROCEDURE — 72197 MRI PELVIS W/O & W/DYE: CPT

## 2023-04-25 ENCOUNTER — HOSPITAL ENCOUNTER (OUTPATIENT)
Dept: HOSPITAL 53 - M SMT | Age: 78
End: 2023-04-25
Attending: UROLOGY
Payer: COMMERCIAL

## 2023-04-25 DIAGNOSIS — R97.20: Primary | ICD-10-CM

## 2023-09-20 ENCOUNTER — HOSPITAL ENCOUNTER (OUTPATIENT)
Dept: HOSPITAL 53 - M RAD | Age: 78
End: 2023-09-20
Attending: FAMILY MEDICINE
Payer: COMMERCIAL

## 2023-09-20 DIAGNOSIS — E04.1: Primary | ICD-10-CM

## 2024-09-26 ENCOUNTER — HOSPITAL ENCOUNTER (OUTPATIENT)
Dept: HOSPITAL 53 - M WHC | Age: 79
End: 2024-09-26
Attending: FAMILY MEDICINE
Payer: MEDICARE

## 2024-09-26 DIAGNOSIS — M81.0: Primary | ICD-10-CM
